# Patient Record
Sex: FEMALE | Race: WHITE | NOT HISPANIC OR LATINO | Employment: FULL TIME | URBAN - NONMETROPOLITAN AREA
[De-identification: names, ages, dates, MRNs, and addresses within clinical notes are randomized per-mention and may not be internally consistent; named-entity substitution may affect disease eponyms.]

---

## 2017-04-18 ENCOUNTER — PROCEDURE VISIT (OUTPATIENT)
Dept: OBSTETRICS AND GYNECOLOGY | Facility: CLINIC | Age: 32
End: 2017-04-18

## 2017-04-18 ENCOUNTER — OFFICE VISIT (OUTPATIENT)
Dept: OBSTETRICS AND GYNECOLOGY | Facility: CLINIC | Age: 32
End: 2017-04-18

## 2017-04-18 VITALS
BODY MASS INDEX: 31.34 KG/M2 | WEIGHT: 166 LBS | DIASTOLIC BLOOD PRESSURE: 64 MMHG | SYSTOLIC BLOOD PRESSURE: 106 MMHG | HEIGHT: 61 IN

## 2017-04-18 DIAGNOSIS — F17.200 SMOKER: ICD-10-CM

## 2017-04-18 DIAGNOSIS — N92.0 MENORRHAGIA WITH REGULAR CYCLE: Primary | ICD-10-CM

## 2017-04-18 DIAGNOSIS — N93.8 DUB (DYSFUNCTIONAL UTERINE BLEEDING): Primary | ICD-10-CM

## 2017-04-18 PROCEDURE — 76830 TRANSVAGINAL US NON-OB: CPT | Performed by: OBSTETRICS & GYNECOLOGY

## 2017-04-18 PROCEDURE — 99203 OFFICE O/P NEW LOW 30 MIN: CPT | Performed by: OBSTETRICS & GYNECOLOGY

## 2017-04-18 RX ORDER — PHENTERMINE HYDROCHLORIDE 37.5 MG/1
TABLET ORAL
Refills: 2 | COMMUNITY
Start: 2017-04-06 | End: 2019-04-18

## 2017-04-18 NOTE — PROGRESS NOTES
Subjective   Chief Complaint   Patient presents with   • Transitional Care Management     New pt referred by ZARI Estrella for DUB.     Beverley Silverio is a 31 y.o. year old  presenting to be seen with complaints of trouble with her menses.    MENSTRUAL Hx:  Patient's last menstrual period was 2017 (exact date).  In the past 6 months her cycles have been regular, predictable and occur monthly.   Her menstrual flow is typically excessive.   Each month on average there are roughly 7-10 days of very heavy flow.  Intermenstrual bleeding is absent.  Post-coital bleeding is absent.  Dysmenorrhea: none and is not affecting her activities of daily living  PMS: none and is not affecting her activities of daily living  Her cycles are a source of concern for her that she wishes to discuss today.    SEXUAL Hx:  She is sexually active.  In the past year there has not been new sexual partners.  Condoms are not typically used.  She would not like to be screened for STD's at today's exam.  Current birth control method: tubal ligation and at time of  section.    No Additional Complaints Reported    The following portions of the patient's history were reviewed and updated as appropriate:current medications, allergies, past family history, past medical history, past social history and past surgical history    Smoking status: Current Every Day Smoker                                                   Packs/day: 0.00      Years: 0.00         Types: Cigarettes  Smokeless status: Current User                      Review of Systems   Constitutional: Negative for fever and unexpected weight change.   Eyes: Negative for photophobia and visual disturbance.   Respiratory: Negative for cough, choking and chest tightness.    Gastrointestinal: Negative for constipation, diarrhea and nausea.   Endocrine: Negative for cold intolerance and heat intolerance.   Genitourinary: Positive for menstrual problem and vaginal bleeding.  "Negative for pelvic pain and vaginal discharge.   Neurological: Negative for light-headedness and headaches.   Psychiatric/Behavioral: Negative for confusion, decreased concentration and dysphoric mood.         Objective   /64 (BP Location: Right arm, Patient Position: Sitting, Cuff Size: Adult)  Ht 61\" (154.9 cm)  Wt 166 lb (75.3 kg)  LMP 04/17/2017 (Exact Date)  Breastfeeding? No  BMI 31.37 kg/m2    General:  well developed; well nourished  no acute distress   Skin:  No suspicious lesions seen   Thyroid: normal to inspection and palpation   Lungs:  breathing is unlabored   Heart:  regular rate and rhythm, S1, S2 normal, no murmur, click, rub or gallop   Breasts:  Not performed.   Abdomen: soft, non-tender; no masses  no umbilical or inginual hernias are present  no hepato-splenomegaly   Pelvis: Not performed.     Lab Review   No data reviewed    Imaging   Pelvic ultrasound images independantly reviewed - and is normal        Assessment   1. Menorrhagia     Plan   1. Options of expectant management, medical therapies, ablation and hysterectomy discussed  2. Labs  3. RTO for poss EMBx and pap smear collection  4. Follow up 2 weeks    New Medications Ordered This Visit   Medications   • phentermine (ADIPEX-P) 37.5 MG tablet     Sig: TK ONE T PO QAM     Refill:  2          This note was electronically signed.    Jj Danielson MD  April 18, 2017    "

## 2017-04-19 LAB
BASOPHILS # BLD AUTO: 0.03 10*3/MM3 (ref 0–0.2)
BASOPHILS NFR BLD AUTO: 0.3 % (ref 0–2)
EOSINOPHIL # BLD AUTO: 0.2 10*3/MM3 (ref 0–0.7)
EOSINOPHIL NFR BLD AUTO: 1.7 % (ref 0–4)
ERYTHROCYTE [DISTWIDTH] IN BLOOD BY AUTOMATED COUNT: 14.2 % (ref 12–15)
HCT VFR BLD AUTO: 40.4 % (ref 37–47)
HGB BLD-MCNC: 12.8 G/DL (ref 12–16)
IMM GRANULOCYTES # BLD: 0.03 10*3/MM3 (ref 0–0.03)
IMM GRANULOCYTES NFR BLD: 0.3 % (ref 0–5)
LYMPHOCYTES # BLD AUTO: 2.9 10*3/MM3 (ref 0.72–4.86)
LYMPHOCYTES NFR BLD AUTO: 24.7 % (ref 15–45)
MCH RBC QN AUTO: 29.1 PG (ref 28–32)
MCHC RBC AUTO-ENTMCNC: 31.7 G/DL (ref 33–36)
MCV RBC AUTO: 91.8 FL (ref 82–98)
MONOCYTES # BLD AUTO: 0.65 10*3/MM3 (ref 0.19–1.3)
MONOCYTES NFR BLD AUTO: 5.5 % (ref 4–12)
NEUTROPHILS # BLD AUTO: 7.91 10*3/MM3 (ref 1.87–8.4)
NEUTROPHILS NFR BLD AUTO: 67.5 % (ref 39–78)
PLATELET # BLD AUTO: 361 10*3/MM3 (ref 130–400)
RBC # BLD AUTO: 4.4 10*6/MM3 (ref 4.2–5.4)
T3RU NFR SERPL: 24 % (ref 24–39)
T4 FREE SERPL-MCNC: 1.36 NG/DL (ref 0.78–2.19)
TSH SERPL DL<=0.005 MIU/L-ACNC: 0.2 MIU/ML (ref 0.47–4.68)
WBC # BLD AUTO: 11.72 10*3/MM3 (ref 4.8–10.8)

## 2017-05-03 ENCOUNTER — OFFICE VISIT (OUTPATIENT)
Dept: OBSTETRICS AND GYNECOLOGY | Facility: CLINIC | Age: 32
End: 2017-05-03

## 2017-05-03 VITALS
BODY MASS INDEX: 31.91 KG/M2 | SYSTOLIC BLOOD PRESSURE: 110 MMHG | HEIGHT: 61 IN | WEIGHT: 169 LBS | DIASTOLIC BLOOD PRESSURE: 70 MMHG

## 2017-05-03 DIAGNOSIS — F17.200 SMOKER: ICD-10-CM

## 2017-05-03 DIAGNOSIS — N92.0 MENORRHAGIA WITH REGULAR CYCLE: Primary | ICD-10-CM

## 2017-05-03 LAB
B-HCG UR QL: NEGATIVE
INTERNAL NEGATIVE CONTROL: NEGATIVE
INTERNAL POSITIVE CONTROL: NEGATIVE
Lab: NORMAL

## 2017-05-03 PROCEDURE — 99214 OFFICE O/P EST MOD 30 MIN: CPT | Performed by: OBSTETRICS & GYNECOLOGY

## 2017-05-03 PROCEDURE — 87624 HPV HI-RISK TYP POOLED RSLT: CPT | Performed by: OBSTETRICS & GYNECOLOGY

## 2017-05-03 PROCEDURE — 88305 TISSUE EXAM BY PATHOLOGIST: CPT | Performed by: OBSTETRICS & GYNECOLOGY

## 2017-05-03 PROCEDURE — 58100 BIOPSY OF UTERUS LINING: CPT | Performed by: OBSTETRICS & GYNECOLOGY

## 2017-05-03 PROCEDURE — 81025 URINE PREGNANCY TEST: CPT | Performed by: OBSTETRICS & GYNECOLOGY

## 2017-05-03 PROCEDURE — G0123 SCREEN CERV/VAG THIN LAYER: HCPCS | Performed by: OBSTETRICS & GYNECOLOGY

## 2017-05-03 RX ORDER — SODIUM CHLORIDE 9 MG/ML
100 INJECTION, SOLUTION INTRAVENOUS CONTINUOUS
Status: CANCELLED | OUTPATIENT
Start: 2017-05-03

## 2017-05-03 RX ORDER — SODIUM CHLORIDE 0.9 % (FLUSH) 0.9 %
1-10 SYRINGE (ML) INJECTION AS NEEDED
Status: CANCELLED | OUTPATIENT
Start: 2017-05-03

## 2017-05-05 ENCOUNTER — TELEPHONE (OUTPATIENT)
Dept: OBSTETRICS AND GYNECOLOGY | Facility: CLINIC | Age: 32
End: 2017-05-05

## 2017-05-05 LAB
CYTO UR: NORMAL
LAB AP CASE REPORT: NORMAL
LAB AP CLINICAL INFORMATION: NORMAL
Lab: NORMAL
PATH REPORT.FINAL DX SPEC: NORMAL
PATH REPORT.GROSS SPEC: NORMAL

## 2017-05-08 ENCOUNTER — APPOINTMENT (OUTPATIENT)
Dept: PREADMISSION TESTING | Facility: HOSPITAL | Age: 32
End: 2017-05-08

## 2017-05-08 ENCOUNTER — RESULTS ENCOUNTER (OUTPATIENT)
Dept: OBSTETRICS AND GYNECOLOGY | Facility: CLINIC | Age: 32
End: 2017-05-08

## 2017-05-08 VITALS
WEIGHT: 173.4 LBS | OXYGEN SATURATION: 100 % | SYSTOLIC BLOOD PRESSURE: 106 MMHG | HEART RATE: 78 BPM | TEMPERATURE: 98.6 F | BODY MASS INDEX: 31.91 KG/M2 | RESPIRATION RATE: 16 BRPM | DIASTOLIC BLOOD PRESSURE: 65 MMHG | HEIGHT: 62 IN

## 2017-05-08 DIAGNOSIS — N92.0 MENORRHAGIA WITH REGULAR CYCLE: ICD-10-CM

## 2017-05-08 LAB
DEPRECATED RDW RBC AUTO: 44.7 FL (ref 40–54)
ERYTHROCYTE [DISTWIDTH] IN BLOOD BY AUTOMATED COUNT: 13.8 % (ref 12–15)
HCT VFR BLD AUTO: 39 % (ref 37–47)
HGB BLD-MCNC: 12.6 G/DL (ref 12–16)
MCH RBC QN AUTO: 28.4 PG (ref 28–32)
MCHC RBC AUTO-ENTMCNC: 32.3 G/DL (ref 33–36)
MCV RBC AUTO: 88 FL (ref 82–98)
PLATELET # BLD AUTO: 346 10*3/MM3 (ref 130–400)
PMV BLD AUTO: 10.7 FL (ref 6–12)
RBC # BLD AUTO: 4.43 10*6/MM3 (ref 4.2–5.4)
WBC NRBC COR # BLD: 9.86 10*3/MM3 (ref 4.8–10.8)

## 2017-05-08 PROCEDURE — 85027 COMPLETE CBC AUTOMATED: CPT | Performed by: OBSTETRICS & GYNECOLOGY

## 2017-05-08 PROCEDURE — 36415 COLL VENOUS BLD VENIPUNCTURE: CPT | Performed by: OBSTETRICS & GYNECOLOGY

## 2017-05-09 LAB
GEN CATEG CVX/VAG CYTO-IMP: NORMAL
LAB AP CASE REPORT: NORMAL
LAB AP GYN ADDITIONAL INFORMATION: NORMAL
Lab: NORMAL
PATH INTERP SPEC-IMP: NORMAL
STAT OF ADQ CVX/VAG CYTO-IMP: NORMAL

## 2017-05-11 ENCOUNTER — ANESTHESIA EVENT (OUTPATIENT)
Dept: PERIOP | Facility: HOSPITAL | Age: 32
End: 2017-05-11

## 2017-05-12 ENCOUNTER — HOSPITAL ENCOUNTER (OUTPATIENT)
Facility: HOSPITAL | Age: 32
Setting detail: HOSPITAL OUTPATIENT SURGERY
Discharge: HOME | End: 2017-05-12
Attending: OBSTETRICS & GYNECOLOGY | Admitting: OBSTETRICS & GYNECOLOGY

## 2017-05-12 ENCOUNTER — ANESTHESIA (OUTPATIENT)
Dept: PERIOP | Facility: HOSPITAL | Age: 32
End: 2017-05-12

## 2017-05-12 VITALS
RESPIRATION RATE: 20 BRPM | OXYGEN SATURATION: 96 % | TEMPERATURE: 98.4 F | SYSTOLIC BLOOD PRESSURE: 122 MMHG | HEART RATE: 80 BPM | DIASTOLIC BLOOD PRESSURE: 70 MMHG

## 2017-05-12 DIAGNOSIS — N92.0 MENORRHAGIA WITH REGULAR CYCLE: ICD-10-CM

## 2017-05-12 LAB
ABO GROUP BLD: NORMAL
BLD GP AB SCN SERPL QL: NEGATIVE
HCG SERPL QL: NEGATIVE
RH BLD: POSITIVE

## 2017-05-12 PROCEDURE — 58563 HYSTEROSCOPY ABLATION: CPT | Performed by: OBSTETRICS & GYNECOLOGY

## 2017-05-12 PROCEDURE — 86850 RBC ANTIBODY SCREEN: CPT | Performed by: OBSTETRICS & GYNECOLOGY

## 2017-05-12 PROCEDURE — 25010000002 MIDAZOLAM PER 1 MG: Performed by: ANESTHESIOLOGY

## 2017-05-12 PROCEDURE — 25010000002 DEXAMETHASONE PER 1 MG: Performed by: ANESTHESIOLOGY

## 2017-05-12 PROCEDURE — 25010000002 KETOROLAC TROMETHAMINE PER 15 MG: Performed by: NURSE ANESTHETIST, CERTIFIED REGISTERED

## 2017-05-12 PROCEDURE — 86900 BLOOD TYPING SEROLOGIC ABO: CPT | Performed by: OBSTETRICS & GYNECOLOGY

## 2017-05-12 PROCEDURE — 25010000002 PROPOFOL 10 MG/ML EMULSION: Performed by: NURSE ANESTHETIST, CERTIFIED REGISTERED

## 2017-05-12 PROCEDURE — 86901 BLOOD TYPING SEROLOGIC RH(D): CPT | Performed by: OBSTETRICS & GYNECOLOGY

## 2017-05-12 PROCEDURE — 84703 CHORIONIC GONADOTROPIN ASSAY: CPT | Performed by: OBSTETRICS & GYNECOLOGY

## 2017-05-12 PROCEDURE — 25010000002 FENTANYL CITRATE (PF) 100 MCG/2ML SOLUTION: Performed by: NURSE ANESTHETIST, CERTIFIED REGISTERED

## 2017-05-12 RX ORDER — PROPOFOL 10 MG/ML
VIAL (ML) INTRAVENOUS AS NEEDED
Status: DISCONTINUED | OUTPATIENT
Start: 2017-05-12 | End: 2017-05-12 | Stop reason: SURG

## 2017-05-12 RX ORDER — MEPERIDINE HYDROCHLORIDE 25 MG/ML
12.5 INJECTION INTRAMUSCULAR; INTRAVENOUS; SUBCUTANEOUS
Status: DISCONTINUED | OUTPATIENT
Start: 2017-05-12 | End: 2017-05-12 | Stop reason: HOSPADM

## 2017-05-12 RX ORDER — MIDAZOLAM HYDROCHLORIDE 1 MG/ML
2 INJECTION INTRAMUSCULAR; INTRAVENOUS
Status: DISCONTINUED | OUTPATIENT
Start: 2017-05-12 | End: 2017-05-12 | Stop reason: HOSPADM

## 2017-05-12 RX ORDER — LABETALOL HYDROCHLORIDE 5 MG/ML
5 INJECTION, SOLUTION INTRAVENOUS
Status: DISCONTINUED | OUTPATIENT
Start: 2017-05-12 | End: 2017-05-12 | Stop reason: HOSPADM

## 2017-05-12 RX ORDER — MIDAZOLAM HYDROCHLORIDE 1 MG/ML
1 INJECTION INTRAMUSCULAR; INTRAVENOUS
Status: DISCONTINUED | OUTPATIENT
Start: 2017-05-12 | End: 2017-05-12 | Stop reason: HOSPADM

## 2017-05-12 RX ORDER — SCOLOPAMINE TRANSDERMAL SYSTEM 1 MG/1
1 PATCH, EXTENDED RELEASE TRANSDERMAL ONCE
Status: DISCONTINUED | OUTPATIENT
Start: 2017-05-12 | End: 2017-05-12 | Stop reason: HOSPADM

## 2017-05-12 RX ORDER — IPRATROPIUM BROMIDE AND ALBUTEROL SULFATE 2.5; .5 MG/3ML; MG/3ML
3 SOLUTION RESPIRATORY (INHALATION) ONCE AS NEEDED
Status: DISCONTINUED | OUTPATIENT
Start: 2017-05-12 | End: 2017-05-12 | Stop reason: HOSPADM

## 2017-05-12 RX ORDER — SODIUM CHLORIDE, SODIUM LACTATE, POTASSIUM CHLORIDE, CALCIUM CHLORIDE 600; 310; 30; 20 MG/100ML; MG/100ML; MG/100ML; MG/100ML
9 INJECTION, SOLUTION INTRAVENOUS CONTINUOUS
Status: DISCONTINUED | OUTPATIENT
Start: 2017-05-12 | End: 2017-05-12 | Stop reason: HOSPADM

## 2017-05-12 RX ORDER — DEXTROSE MONOHYDRATE 25 G/50ML
12.5 INJECTION, SOLUTION INTRAVENOUS AS NEEDED
Status: DISCONTINUED | OUTPATIENT
Start: 2017-05-12 | End: 2017-05-12 | Stop reason: HOSPADM

## 2017-05-12 RX ORDER — DEXAMETHASONE SODIUM PHOSPHATE 4 MG/ML
4 INJECTION, SOLUTION INTRA-ARTICULAR; INTRALESIONAL; INTRAMUSCULAR; INTRAVENOUS; SOFT TISSUE ONCE AS NEEDED
Status: COMPLETED | OUTPATIENT
Start: 2017-05-12 | End: 2017-05-12

## 2017-05-12 RX ORDER — HYDRALAZINE HYDROCHLORIDE 20 MG/ML
5 INJECTION INTRAMUSCULAR; INTRAVENOUS
Status: DISCONTINUED | OUTPATIENT
Start: 2017-05-12 | End: 2017-05-12 | Stop reason: HOSPADM

## 2017-05-12 RX ORDER — SODIUM CHLORIDE 0.9 % (FLUSH) 0.9 %
1-10 SYRINGE (ML) INJECTION AS NEEDED
Status: DISCONTINUED | OUTPATIENT
Start: 2017-05-12 | End: 2017-05-12 | Stop reason: HOSPADM

## 2017-05-12 RX ORDER — HYDROCODONE BITARTRATE AND ACETAMINOPHEN 5; 325 MG/1; MG/1
1-2 TABLET ORAL EVERY 4 HOURS PRN
Qty: 30 TABLET | Refills: 0 | Status: SHIPPED | OUTPATIENT
Start: 2017-05-12 | End: 2017-05-31 | Stop reason: HOSPADM

## 2017-05-12 RX ORDER — ONDANSETRON 2 MG/ML
4 INJECTION INTRAMUSCULAR; INTRAVENOUS ONCE AS NEEDED
Status: DISCONTINUED | OUTPATIENT
Start: 2017-05-12 | End: 2017-05-12 | Stop reason: HOSPADM

## 2017-05-12 RX ORDER — DIPHENHYDRAMINE HYDROCHLORIDE 50 MG/ML
12.5 INJECTION INTRAMUSCULAR; INTRAVENOUS
Status: DISCONTINUED | OUTPATIENT
Start: 2017-05-12 | End: 2017-05-12 | Stop reason: HOSPADM

## 2017-05-12 RX ORDER — MAGNESIUM HYDROXIDE 1200 MG/15ML
LIQUID ORAL AS NEEDED
Status: DISCONTINUED | OUTPATIENT
Start: 2017-05-12 | End: 2017-05-12 | Stop reason: HOSPADM

## 2017-05-12 RX ORDER — LIDOCAINE HYDROCHLORIDE 20 MG/ML
INJECTION, SOLUTION INFILTRATION; PERINEURAL AS NEEDED
Status: DISCONTINUED | OUTPATIENT
Start: 2017-05-12 | End: 2017-05-12 | Stop reason: SURG

## 2017-05-12 RX ORDER — MORPHINE SULFATE 2 MG/ML
2 INJECTION, SOLUTION INTRAMUSCULAR; INTRAVENOUS
Status: DISCONTINUED | OUTPATIENT
Start: 2017-05-12 | End: 2017-05-12 | Stop reason: HOSPADM

## 2017-05-12 RX ORDER — FENTANYL CITRATE 50 UG/ML
INJECTION, SOLUTION INTRAMUSCULAR; INTRAVENOUS AS NEEDED
Status: DISCONTINUED | OUTPATIENT
Start: 2017-05-12 | End: 2017-05-12 | Stop reason: SURG

## 2017-05-12 RX ORDER — IBUPROFEN 600 MG/1
600 TABLET ORAL EVERY 6 HOURS PRN
Status: DISCONTINUED | OUTPATIENT
Start: 2017-05-12 | End: 2017-05-12 | Stop reason: HOSPADM

## 2017-05-12 RX ORDER — KETOROLAC TROMETHAMINE 30 MG/ML
INJECTION, SOLUTION INTRAMUSCULAR; INTRAVENOUS AS NEEDED
Status: DISCONTINUED | OUTPATIENT
Start: 2017-05-12 | End: 2017-05-12 | Stop reason: SURG

## 2017-05-12 RX ORDER — NALOXONE HCL 0.4 MG/ML
0.4 VIAL (ML) INJECTION AS NEEDED
Status: DISCONTINUED | OUTPATIENT
Start: 2017-05-12 | End: 2017-05-12 | Stop reason: HOSPADM

## 2017-05-12 RX ORDER — SODIUM CHLORIDE 9 MG/ML
100 INJECTION, SOLUTION INTRAVENOUS CONTINUOUS
Status: DISCONTINUED | OUTPATIENT
Start: 2017-05-12 | End: 2017-05-12 | Stop reason: HOSPADM

## 2017-05-12 RX ORDER — FENTANYL CITRATE 50 UG/ML
25 INJECTION, SOLUTION INTRAMUSCULAR; INTRAVENOUS
Status: DISCONTINUED | OUTPATIENT
Start: 2017-05-12 | End: 2017-05-12 | Stop reason: HOSPADM

## 2017-05-12 RX ADMIN — SCOPALAMINE 1 PATCH: 1 PATCH, EXTENDED RELEASE TRANSDERMAL at 06:37

## 2017-05-12 RX ADMIN — FENTANYL CITRATE 100 MCG: 50 INJECTION INTRAMUSCULAR; INTRAVENOUS at 07:14

## 2017-05-12 RX ADMIN — SODIUM CHLORIDE, POTASSIUM CHLORIDE, SODIUM LACTATE AND CALCIUM CHLORIDE 9 ML/HR: 600; 310; 30; 20 INJECTION, SOLUTION INTRAVENOUS at 06:36

## 2017-05-12 RX ADMIN — FENTANYL CITRATE 100 MCG: 50 INJECTION INTRAMUSCULAR; INTRAVENOUS at 07:19

## 2017-05-12 RX ADMIN — MIDAZOLAM HYDROCHLORIDE 2 MG: 1 INJECTION, SOLUTION INTRAMUSCULAR; INTRAVENOUS at 06:36

## 2017-05-12 RX ADMIN — KETOROLAC TROMETHAMINE 30 MG: 30 INJECTION, SOLUTION INTRAMUSCULAR at 07:26

## 2017-05-12 RX ADMIN — LIDOCAINE HYDROCHLORIDE 1 ML: 10 INJECTION, SOLUTION EPIDURAL; INFILTRATION; INTRACAUDAL; PERINEURAL at 06:36

## 2017-05-12 RX ADMIN — DEXAMETHASONE SODIUM PHOSPHATE 4 MG: 4 INJECTION, SOLUTION INTRAMUSCULAR; INTRAVENOUS at 06:36

## 2017-05-12 RX ADMIN — FENTANYL CITRATE 50 MCG: 50 INJECTION INTRAMUSCULAR; INTRAVENOUS at 07:17

## 2017-05-12 RX ADMIN — LIDOCAINE HYDROCHLORIDE 50 MG: 20 INJECTION, SOLUTION INFILTRATION; PERINEURAL at 07:14

## 2017-05-12 RX ADMIN — PROPOFOL 200 MG: 10 INJECTION, EMULSION INTRAVENOUS at 07:14

## 2017-05-31 ENCOUNTER — OFFICE VISIT (OUTPATIENT)
Dept: OBSTETRICS AND GYNECOLOGY | Facility: CLINIC | Age: 32
End: 2017-05-31

## 2017-05-31 VITALS
BODY MASS INDEX: 31.65 KG/M2 | WEIGHT: 172 LBS | DIASTOLIC BLOOD PRESSURE: 74 MMHG | HEIGHT: 62 IN | SYSTOLIC BLOOD PRESSURE: 120 MMHG

## 2017-05-31 DIAGNOSIS — N92.0 MENORRHAGIA WITH REGULAR CYCLE: ICD-10-CM

## 2017-05-31 DIAGNOSIS — Z09 POSTOP CHECK: Primary | ICD-10-CM

## 2017-05-31 PROCEDURE — 99213 OFFICE O/P EST LOW 20 MIN: CPT | Performed by: OBSTETRICS & GYNECOLOGY

## 2018-11-05 ENCOUNTER — LAB (OUTPATIENT)
Dept: LAB | Facility: HOSPITAL | Age: 33
End: 2018-11-05

## 2018-11-05 ENCOUNTER — TRANSCRIBE ORDERS (OUTPATIENT)
Dept: ADMINISTRATIVE | Facility: HOSPITAL | Age: 33
End: 2018-11-05

## 2018-11-05 ENCOUNTER — HOSPITAL ENCOUNTER (OUTPATIENT)
Dept: CT IMAGING | Facility: HOSPITAL | Age: 33
Discharge: HOME | End: 2018-11-05
Admitting: NURSE PRACTITIONER

## 2018-11-05 ENCOUNTER — HOSPITAL ENCOUNTER (OUTPATIENT)
Dept: GENERAL RADIOLOGY | Facility: HOSPITAL | Age: 33
Discharge: HOME | End: 2018-11-05

## 2018-11-05 DIAGNOSIS — R51.9 NONINTRACTABLE HEADACHE, UNSPECIFIED CHRONICITY PATTERN, UNSPECIFIED HEADACHE TYPE: ICD-10-CM

## 2018-11-05 DIAGNOSIS — M54.2 CERVICALGIA: ICD-10-CM

## 2018-11-05 DIAGNOSIS — R51.9 NONINTRACTABLE HEADACHE, UNSPECIFIED CHRONICITY PATTERN, UNSPECIFIED HEADACHE TYPE: Primary | ICD-10-CM

## 2018-11-05 LAB
AUTO MIXED CELLS #: 0.7 10*3/MM3 (ref 0.1–2.6)
AUTO MIXED CELLS %: 6.1 % (ref 0.1–24)
ERYTHROCYTE [DISTWIDTH] IN BLOOD BY AUTOMATED COUNT: 12.8 % (ref 12–15)
ERYTHROCYTE [SEDIMENTATION RATE] IN BLOOD: 7 MM/HR (ref 0–20)
HCT VFR BLD AUTO: 42.9 % (ref 37–47)
HGB BLD-MCNC: 13.8 G/DL (ref 12–16)
LYMPHOCYTES # BLD AUTO: 2.9 10*3/MM3 (ref 0.8–7)
LYMPHOCYTES NFR BLD AUTO: 25.6 % (ref 15–45)
MCH RBC QN AUTO: 29.1 PG (ref 28–32)
MCHC RBC AUTO-ENTMCNC: 32.2 G/DL (ref 33–36)
MCV RBC AUTO: 90.5 FL (ref 82–98)
NEUTROPHILS # BLD AUTO: 7.7 10*3/MM3 (ref 1.5–8.3)
NEUTROPHILS NFR BLD AUTO: 68.3 % (ref 39–78)
PLATELET # BLD AUTO: 340 10*3/MM3 (ref 130–400)
PMV BLD AUTO: 9.6 FL (ref 6–12)
RBC # BLD AUTO: 4.74 10*6/MM3 (ref 4.2–5.4)
WBC NRBC COR # BLD: 11.3 10*3/MM3 (ref 4.8–10.8)

## 2018-11-05 PROCEDURE — 85652 RBC SED RATE AUTOMATED: CPT

## 2018-11-05 PROCEDURE — 85025 COMPLETE CBC W/AUTO DIFF WBC: CPT | Performed by: NURSE PRACTITIONER

## 2018-11-05 PROCEDURE — 70450 CT HEAD/BRAIN W/O DYE: CPT

## 2018-11-05 PROCEDURE — 72050 X-RAY EXAM NECK SPINE 4/5VWS: CPT

## 2018-11-05 PROCEDURE — 36415 COLL VENOUS BLD VENIPUNCTURE: CPT

## 2019-03-20 ENCOUNTER — TRANSCRIBE ORDERS (OUTPATIENT)
Dept: ADMINISTRATIVE | Facility: HOSPITAL | Age: 34
End: 2019-03-20

## 2019-03-20 DIAGNOSIS — E04.2 MULTIPLE THYROID NODULES: Primary | ICD-10-CM

## 2019-04-02 ENCOUNTER — HOSPITAL ENCOUNTER (OUTPATIENT)
Dept: NUCLEAR MEDICINE | Facility: HOSPITAL | Age: 34
Discharge: HOME | End: 2019-04-02

## 2019-04-02 DIAGNOSIS — E04.2 MULTIPLE THYROID NODULES: ICD-10-CM

## 2019-04-02 PROCEDURE — 0 SODIUM IODIDE 3.7 MBQ CAPSULE: Performed by: NURSE PRACTITIONER

## 2019-04-02 PROCEDURE — A9516 IODINE I-123 SOD IODIDE MIC: HCPCS | Performed by: NURSE PRACTITIONER

## 2019-04-02 PROCEDURE — 78014 THYROID IMAGING W/BLOOD FLOW: CPT

## 2019-04-02 RX ORDER — SODIUM IODIDE I 123 100 UCI/1
1 CAPSULE, GELATIN COATED ORAL
Status: COMPLETED | OUTPATIENT
Start: 2019-04-02 | End: 2019-04-02

## 2019-04-02 RX ADMIN — SODIUM IODIDE I 123 1 CAPSULE: 100 CAPSULE, GELATIN COATED ORAL at 08:07

## 2019-04-03 ENCOUNTER — HOSPITAL ENCOUNTER (OUTPATIENT)
Dept: NUCLEAR MEDICINE | Facility: HOSPITAL | Age: 34
Discharge: HOME | End: 2019-04-03

## 2019-04-18 ENCOUNTER — OFFICE VISIT (OUTPATIENT)
Dept: GASTROENTEROLOGY | Facility: CLINIC | Age: 34
End: 2019-04-18

## 2019-04-18 VITALS
HEART RATE: 88 BPM | WEIGHT: 213 LBS | BODY MASS INDEX: 40.22 KG/M2 | DIASTOLIC BLOOD PRESSURE: 80 MMHG | SYSTOLIC BLOOD PRESSURE: 122 MMHG | HEIGHT: 61 IN | OXYGEN SATURATION: 99 %

## 2019-04-18 DIAGNOSIS — Z78.9 NONSMOKER: ICD-10-CM

## 2019-04-18 DIAGNOSIS — R10.32 LEFT LOWER QUADRANT PAIN: Primary | ICD-10-CM

## 2019-04-18 DIAGNOSIS — E66.9 OBESITY, UNSPECIFIED OBESITY SEVERITY, UNSPECIFIED OBESITY TYPE: ICD-10-CM

## 2019-04-18 PROCEDURE — 99203 OFFICE O/P NEW LOW 30 MIN: CPT | Performed by: CLINICAL NURSE SPECIALIST

## 2019-04-18 NOTE — PROGRESS NOTES
Beverley Silverio  1985  Chief Complaint   Patient presents with   • GI Problem     New patient ref by Avelino Arce NP for LLQ pain     Subjective   HPI  Beverley Silverio is a 33 y.o. female who presents with a complaint of LLQ abdominal pain that lasted one week. It began 2 weeks before CT scan and lasted a week. No triggers. No alleviating factors. It was sharp and stabbing the day of her period it started. No other associated symptoms. She went to her PCP and CT was performed with contrast on 2019. No acute process benign appearing bone lesion in the pelvis. She was referred here for opinion. She has no change in bowels no constipation no diarrhea. No family hx for colon cancer. No rectal bleeding. Her pain lasted one week and she says that it is gone and has not returned since that time.    Past Medical History:   Diagnosis Date   • Abnormal Pap smear of cervix    • Anxiety    • Menorrhagia with irregular cycle    • Migraine      Past Surgical History:   Procedure Laterality Date   •  SECTION     • D&C HYSTEROSCOPY ENDOMETRIAL ABLATION N/A 2017    Procedure: DILATATION AND CURETTAGE HYSTEROSCOPY NOVASURE ENDOMETRIAL ABLATION;  Surgeon: Jj Danielson MD;  Location: [facility];  Service:    • TUBAL ABDOMINAL LIGATION         No outpatient medications have been marked as taking for the 19 encounter (Office Visit) with Gayathri Retana APRN.     Allergies   Allergen Reactions   • Sulfa Antibiotics Rash     Social History     Socioeconomic History   • Marital status:      Spouse name: Not on file   • Number of children: Not on file   • Years of education: Not on file   • Highest education level: Not on file   Tobacco Use   • Smoking status: Former Smoker     Packs/day: 0.50     Years: 17.00     Pack years: 8.50     Types: Cigarettes   • Smokeless tobacco: Never Used   • Tobacco comment: Quite over a year ago   Substance and Sexual Activity   • Alcohol use: No   • Drug  "use: No   • Sexual activity: Not Currently     Partners: Male     Birth control/protection: None     Family History   Problem Relation Age of Onset   • Skin cancer Father    • No Known Problems Mother    • No Known Problems Sister    • No Known Problems Daughter    • Colon cancer Neg Hx    • Colon polyps Neg Hx      Health Maintenance   Topic Date Due   • ANNUAL PHYSICAL  09/18/1986   • PNEUMOCOCCAL VACCINE (19-64 MEDIUM RISK) (1 of 1 - PPSV23) 09/18/2002   • TDAP/TD VACCINES (1 - Tdap) 09/18/2002   • INFLUENZA VACCINE  08/01/2019   • PAP SMEAR  05/03/2020     Review of Systems   Constitutional: Negative for activity change, appetite change, chills, diaphoresis, fatigue, fever and unexpected weight change.   HENT: Negative for ear pain, hearing loss, mouth sores, sore throat, trouble swallowing and voice change.    Eyes: Negative.    Respiratory: Negative for cough, choking, shortness of breath and wheezing.    Cardiovascular: Negative for chest pain and palpitations.   Gastrointestinal: Positive for abdominal pain (LLQ). Negative for blood in stool, constipation, diarrhea, nausea and vomiting.   Endocrine: Negative for cold intolerance and heat intolerance.   Genitourinary: Negative for decreased urine volume, dysuria, frequency, hematuria and urgency.   Musculoskeletal: Negative for back pain, gait problem and myalgias.   Skin: Negative for color change, pallor and rash.   Allergic/Immunologic: Negative for food allergies and immunocompromised state.   Neurological: Negative for dizziness, tremors, seizures, syncope, weakness, light-headedness, numbness and headaches.   Hematological: Negative for adenopathy. Does not bruise/bleed easily.   Psychiatric/Behavioral: Negative for agitation and confusion. The patient is not nervous/anxious.    All other systems reviewed and are negative.    Objective   Vitals:    04/18/19 1003   BP: 122/80   Pulse: 88   SpO2: 99%   Weight: 96.6 kg (213 lb)   Height: 154.9 cm (61\") "     Body mass index is 40.25 kg/m².  Physical Exam   Constitutional: She is oriented to person, place, and time. She appears well-developed and well-nourished.   HENT:   Head: Normocephalic and atraumatic.   Eyes: Pupils are equal, round, and reactive to light.   Neck: Normal range of motion. Neck supple. No tracheal deviation present.   Cardiovascular: Normal rate, regular rhythm and normal heart sounds. Exam reveals no gallop and no friction rub.   No murmur heard.  Pulmonary/Chest: Effort normal and breath sounds normal. No respiratory distress. She has no wheezes. She has no rales. She exhibits no tenderness.   Abdominal: Soft. Bowel sounds are normal. She exhibits no distension. There is no hepatosplenomegaly. There is no tenderness. There is no rigidity, no rebound and no guarding.   Musculoskeletal: Normal range of motion. She exhibits no edema, tenderness or deformity.   Neurological: She is alert and oriented to person, place, and time. She has normal reflexes.   Skin: Skin is warm and dry. No rash noted. No pallor.   Psychiatric: She has a normal mood and affect. Her behavior is normal. Judgment and thought content normal.     Assessment/Plan   Beverley was seen today for gi problem.    Diagnoses and all orders for this visit:    Left lower quadrant pain  Comments:  the day she stared her period and lasted one week.    Nonsmoker    Obesity, unspecified obesity severity, unspecified obesity type    To have repeat xrays ordered by kamala with Dr Rizo. We have discussed colonoscopy evaluation and she wants to wait at this time. Her pain is resolved and she has no recurrent symptoms or GI related symptoms at this time. She is to call me if she changes her mind or desires to be seen again for recurrent symptoms.   I have reviewed and discussed her records today.     EMR Dragon/transcription disclaimer: Much of this encounter note is electronic transcription/translation of spoken language to printed text. The  electronic translation of spoken language may be erroneous, or at times, nonsensical words or phrases may be inadvertently transcribed. Although I have reviewed the note for such errors, some may still exist.  Body mass index is 40.25 kg/m².  No Follow-up on file.    Patient's Body mass index is 40.25 kg/m². BMI is above normal parameters. Recommendations include: nutrition counseling.      All risks, benefits, alternatives, and indications of colonoscopy and/or Endoscopy procedure have been discussed with the patient. Risks to include perforation of the colon requiring possible surgery or colostomy, risk of bleeding from biopsies or removal of colon tissue, possibility of missing a colon polyp or cancer, or adverse drug reaction.  Benefits to include the diagnosis and management of disease of the colon and rectum. Alternatives to include barium enema, radiographic evaluation, lab testing or no intervention. Pt verbalizes understanding and agrees.     Gayathri Retana, APRN  4/18/2019  11:00 AM      Obesity, Adult  Obesity is the condition of having too much total body fat. Being overweight or obese means that your weight is greater than what is considered healthy for your body size. Obesity is determined by a measurement called BMI. BMI is an estimate of body fat and is calculated from height and weight. For adults, a BMI of 30 or higher is considered obese.  Obesity can eventually lead to other health concerns and major illnesses, including:  · Stroke.  · Coronary artery disease (CAD).  · Type 2 diabetes.  · Some types of cancer, including cancers of the colon, breast, uterus, and gallbladder.  · Osteoarthritis.  · High blood pressure (hypertension).  · High cholesterol.  · Sleep apnea.  · Gallbladder stones.  · Infertility problems.  What are the causes?  The main cause of obesity is taking in (consuming) more calories than your body uses for energy. Other factors that contribute to this condition may  include:  · Being born with genes that make you more likely to become obese.  · Having a medical condition that causes obesity. These conditions include:  ¨ Hypothyroidism.  ¨ Polycystic ovarian syndrome (PCOS).  ¨ Binge-eating disorder.  ¨ Cushing syndrome.  · Taking certain medicines, such as steroids, antidepressants, and seizure medicines.  · Not being physically active (sedentary lifestyle).  · Living where there are limited places to exercise safely or buy healthy foods.  · Not getting enough sleep.  What increases the risk?  The following factors may increase your risk of this condition:  · Having a family history of obesity.  · Being a woman of -American descent.  · Being a man of  descent.  What are the signs or symptoms?  Having excessive body fat is the main symptom of this condition.  How is this diagnosed?  This condition may be diagnosed based on:  · Your symptoms.  · Your medical history.  · A physical exam. Your health care provider may measure:  ¨ Your BMI. If you are an adult with a BMI between 25 and less than 30, you are considered overweight. If you are an adult with a BMI of 30 or higher, you are considered obese.  ¨ The distances around your hips and your waist (circumferences). These may be compared to each other to help diagnose your condition.  ¨ Your skinfold thickness. Your health care provider may gently pinch a fold of your skin and measure it.  How is this treated?  Treatment for this condition often includes changing your lifestyle. Treatment may include some or all of the following:  · Dietary changes. Work with your health care provider and a dietitian to set a weight-loss goal that is healthy and reasonable for you. Dietary changes may include eating:  ¨ Smaller portions. A portion size is the amount of a particular food that is healthy for you to eat at one time. This varies from person to person.  ¨ Low-calorie or low-fat options.  ¨ More whole grains, fruits, and  vegetables.  · Regular physical activity. This may include aerobic activity (cardio) and strength training.  · Medicine to help you lose weight. Your health care provider may prescribe medicine if you are unable to lose 1 pound a week after 6 weeks of eating more healthily and doing more physical activity.  · Surgery. Surgical options may include gastric banding and gastric bypass. Surgery may be done if:  ¨ Other treatments have not helped to improve your condition.  ¨ You have a BMI of 40 or higher.  ¨ You have life-threatening health problems related to obesity.  Follow these instructions at home:     Eating and drinking     · Follow recommendations from your health care provider about what you eat and drink. Your health care provider may advise you to:  ¨ Limit fast foods, sweets, and processed snack foods.  ¨ Choose low-fat options, such as low-fat milk instead of whole milk.  ¨ Eat 5 or more servings of fruits or vegetables every day.  ¨ Eat at home more often. This gives you more control over what you eat.  ¨ Choose healthy foods when you eat out.  ¨ Learn what a healthy portion size is.  ¨ Keep low-fat snacks on hand.  ¨ Avoid sugary drinks, such as soda, fruit juice, iced tea sweetened with sugar, and flavored milk.  ¨ Eat a healthy breakfast.  · Drink enough water to keep your urine clear or pale yellow.  · Do not go without eating for long periods of time (do not fast) or follow a fad diet. Fasting and fad diets can be unhealthy and even dangerous.  Physical Activity   · Exercise regularly, as told by your health care provider. Ask your health care provider what types of exercise are safe for you and how often you should exercise.  · Warm up and stretch before being active.  · Cool down and stretch after being active.  · Rest between periods of activity.  Lifestyle   · Limit the time that you spend in front of your TV, computer, or video game system.  · Find ways to reward yourself that do not involve  food.  · Limit alcohol intake to no more than 1 drink a day for nonpregnant women and 2 drinks a day for men. One drink equals 12 oz of beer, 5 oz of wine, or 1½ oz of hard liquor.  General instructions   · Keep a weight loss journal to keep track of the food you eat and how much you exercise you get.  · Take over-the-counter and prescription medicines only as told by your health care provider.  · Take vitamins and supplements only as told by your health care provider.  · Consider joining a support group. Your health care provider may be able to recommend a support group.  · Keep all follow-up visits as told by your health care provider. This is important.  Contact a health care provider if:  · You are unable to meet your weight loss goal after 6 weeks of dietary and lifestyle changes.  This information is not intended to replace advice given to you by your health care provider. Make sure you discuss any questions you have with your health care provider.  Document Released: 01/25/2006 Document Revised: 05/22/2017 Document Reviewed: 10/05/2016  [employer] Interactive Patient Education © 2017 [employer]      If you smoke or use tobacco, 4 minutes reading provided  Steps to Quit Smoking  Smoking tobacco can be harmful to your health and can affect almost every organ in your body. Smoking puts you, and those around you, at risk for developing many serious chronic diseases. Quitting smoking is difficult, but it is one of the best things that you can do for your health. It is never too late to quit.  What are the benefits of quitting smoking?  When you quit smoking, you lower your risk of developing serious diseases and conditions, such as:  · Lung cancer or lung disease, such as COPD.  · Heart disease.  · Stroke.  · Heart attack.  · Infertility.  · Osteoporosis and bone fractures.  Additionally, symptoms such as coughing, wheezing, and shortness of breath may get better when you quit. You may also find that you get sick  less often because your body is stronger at fighting off colds and infections. If you are pregnant, quitting smoking can help to reduce your chances of having a baby of low birth weight.  How do I get ready to quit?  When you decide to quit smoking, create a plan to make sure that you are successful. Before you quit:  · Pick a date to quit. Set a date within the next two weeks to give you time to prepare.  · Write down the reasons why you are quitting. Keep this list in places where you will see it often, such as on your bathroom mirror or in your car or wallet.  · Identify the people, places, things, and activities that make you want to smoke (triggers) and avoid them. Make sure to take these actions:  ¨ Throw away all cigarettes at home, at work, and in your car.  ¨ Throw away smoking accessories, such as ashtrays and lighters.  ¨ Clean your car and make sure to empty the ashtray.  ¨ Clean your home, including curtains and carpets.  · Tell your family, friends, and coworkers that you are quitting. Support from your loved ones can make quitting easier.  · Talk with your health care provider about your options for quitting smoking.  · Find out what treatment options are covered by your health insurance.  What strategies can I use to quit smoking?  Talk with your healthcare provider about different strategies to quit smoking. Some strategies include:  · Quitting smoking altogether instead of gradually lessening how much you smoke over a period of time. Research shows that quitting “cold turkey” is more successful than gradually quitting.  · Attending in-person counseling to help you build problem-solving skills. You are more likely to have success in quitting if you attend several counseling sessions. Even short sessions of 10 minutes can be effective.  · Finding resources and support systems that can help you to quit smoking and remain smoke-free after you quit. These resources are most helpful when you use them  often. They can include:  ¨ Online chats with a counselor.  ¨ Telephone quitlines.  ¨ Printed self-help materials.  ¨ Support groups or group counseling.  ¨ Text messaging programs.  ¨ Mobile phone applications.  · Taking medicines to help you quit smoking. (If you are pregnant or breastfeeding, talk with your health care provider first.) Some medicines contain nicotine and some do not. Both types of medicines help with cravings, but the medicines that include nicotine help to relieve withdrawal symptoms. Your health care provider may recommend:  ¨ Nicotine patches, gum, or lozenges.  ¨ Nicotine inhalers or sprays.  ¨ Non-nicotine medicine that is taken by mouth.  Talk with your health care provider about combining strategies, such as taking medicines while you are also receiving in-person counseling. Using these two strategies together makes you more likely to succeed in quitting than if you used either strategy on its own.  If you are pregnant or breastfeeding, talk with your health care provider about finding counseling or other support strategies to quit smoking. Do not take medicine to help you quit smoking unless told to do so by your health care provider.  What things can I do to make it easier to quit?  Quitting smoking might feel overwhelming at first, but there is a lot that you can do to make it easier. Take these important actions:  · Reach out to your family and friends and ask that they support and encourage you during this time. Call telephone quitlines, reach out to support groups, or work with a counselor for support.  · Ask people who smoke to avoid smoking around you.  · Avoid places that trigger you to smoke, such as bars, parties, or smoke-break areas at work.  · Spend time around people who do not smoke.  · Lessen stress in your life, because stress can be a smoking trigger for some people. To lessen stress, try:  ¨ Exercising regularly.  ¨ Deep-breathing  exercises.  ¨ Yoga.  ¨ Meditating.  ¨ Performing a body scan. This involves closing your eyes, scanning your body from head to toe, and noticing which parts of your body are particularly tense. Purposefully relax the muscles in those areas.  · Download or purchase mobile phone or tablet apps (applications) that can help you stick to your quit plan by providing reminders, tips, and encouragement. There are many free apps, such as QuitGuide from the CDC (Centers for Disease Control and Prevention). You can find other support for quitting smoking (smoking cessation) through smokefree[employer] and other websites.  How will I feel when I quit smoking?  Within the first 24 hours of quitting smoking, you may start to feel some withdrawal symptoms. These symptoms are usually most noticeable 2-3 days after quitting, but they usually do not last beyond 2-3 weeks. Changes or symptoms that you might experience include:  · Mood swings.  · Restlessness, anxiety, or irritation.  · Difficulty concentrating.  · Dizziness.  · Strong cravings for sugary foods in addition to nicotine.  · Mild weight gain.  · Constipation.  · Nausea.  · Coughing or a sore throat.  · Changes in how your medicines work in your body.  · A depressed mood.  · Difficulty sleeping (insomnia).  After the first 2-3 weeks of quitting, you may start to notice more positive results, such as:  · Improved sense of smell and taste.  · Decreased coughing and sore throat.  · Slower heart rate.  · Lower blood pressure.  · Clearer skin.  · The ability to breathe more easily.  · Fewer sick days.  Quitting smoking is very challenging for most people. Do not get discouraged if you are not successful the first time. Some people need to make many attempts to quit before they achieve long-term success. Do your best to stick to your quit plan, and talk with your health care provider if you have any questions or concerns.  This information is not intended to replace advice given to  you by your health care provider. Make sure you discuss any questions you have with your health care provider.  Document Released: 12/12/2002 Document Revised: 08/15/2017 Document Reviewed: 05/03/2016  [employer] Interactive Patient Education © 2017 [employer]

## 2019-04-25 ENCOUNTER — OFFICE VISIT (OUTPATIENT)
Dept: OTOLARYNGOLOGY | Facility: CLINIC | Age: 34
End: 2019-04-25

## 2019-04-25 VITALS
BODY MASS INDEX: 40.24 KG/M2 | TEMPERATURE: 98.1 F | HEIGHT: 61 IN | SYSTOLIC BLOOD PRESSURE: 138 MMHG | DIASTOLIC BLOOD PRESSURE: 85 MMHG | WEIGHT: 213.13 LBS | HEART RATE: 89 BPM

## 2019-04-25 DIAGNOSIS — E04.1 THYROID NODULE: ICD-10-CM

## 2019-04-25 DIAGNOSIS — R63.5 WEIGHT GAIN: ICD-10-CM

## 2019-04-25 DIAGNOSIS — R53.83 FATIGUE, UNSPECIFIED TYPE: ICD-10-CM

## 2019-04-25 DIAGNOSIS — E05.90 HYPERTHYROIDISM: Primary | ICD-10-CM

## 2019-04-25 PROCEDURE — 99214 OFFICE O/P EST MOD 30 MIN: CPT | Performed by: NURSE PRACTITIONER

## 2019-04-25 NOTE — PATIENT INSTRUCTIONS
Medical vs surgical options discussed    Discussed hemithyroidectomy - will concur with Dr Gil and make further recommendations based on his requests.      Will call for further directions  - discussed with Dr Gil who prefers right thyroidectomy    RIGHT THYROIDECTOMY: A RIGHT hemothyroidectomy was recommended. The risks and benefits were explained including but not limited to bleeding, infection, persistent and/or recurrent disease, risks of the general anesthesia, pain, recurrent laryngeal nerve injury with hoarseness and airway loss, parathyroid injury and hypocalcemia. Operative possibilities including hemithyroidectomy, total thyroidectomy and brice dissections were discussed. Possibilities for delayed need for total thyroidectomy pending pathologic diagnosis were also discussed. Alternatives were discussed. No guarantees were made or implied. Questions were asked appropriately answered.      Patient and family were instructed on the proper use of scheduled prescription drugs including their impact on driving and the potential effects during pregnancy.  The potential for overdose was discussed and their safe storage and proper disposal.  The website www.[employer].[place].gov which contains other materials in this regard.    Call for problems or worsening symptoms

## 2019-04-25 NOTE — PROGRESS NOTES
YOB: 1985  Location: [facility]  Location Address: [address] [place] [address], [facility], [address] [place] [address]  Location Phone: [phone]    Chief Complaint   Patient presents with   • thyroid nodule       History of Present Illness  Beverley Silverio is a 33 y.o. female.  Beverley Silverio is here for evaluation of ENT complaints. The patient has had problems with thyroid nodules and hyperthyroidism  The symptoms are not localized to a particular location. The patient has had moderate symptoms. The symptoms have been present for the last several years The symptoms are aggravated by  no identifiable factors. The symptoms are improved by no identifiable factors.  She reports anxiety, inability to lose weight/weight gain.  Denies globus sensation, heart palpitations.  Denies family history of thyroid cancer.  Has been hyperthyroid for several years with reports of variability.         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     TECHNIQUE: 517 uCi of I-123 was ingested and 4 and 24-hour iodine uptake  was calculated. 4 hour scan obtained.     FINDINGS:      The gland is within the upper limits of normal in size, right lobe  measuring approximately 5.7 cm in [id number] length and the left lobe  measuring 5.8 cm.     Within the upper pole of the right lobe there is a proximal 2 cm x 1.7  cm hot nodule suspected. Correlation with ultrasound findings  recommended. No additional  hot or cold lesions appreciated.     The 4 and 24-hour uptake is elevated, 4 hour uptake measures 22% and  24-hour uptake measures 39%.     Autonomous nodule considered.     IMPRESSION:  1. Elevated iodine uptake, 4 hour equals 22% and 24-hour uptake equals  39%.  2. 2 cm hot nodule right upper lobe, autonomous nodule considered.  Ultrasound correlation recommended.  This report was finalized on 2019 08:44 by Dr. Avelino Garcia MD.           3/19 TSH .16    Past Medical History:   Diagnosis Date   • Abnormal Pap smear of cervix    • Anxiety    • Menorrhagia with irregular cycle    • Migraine        Past Surgical History:   Procedure Laterality Date   •  SECTION     • D&C HYSTEROSCOPY ENDOMETRIAL ABLATION N/A 2017    Procedure: DILATATION AND CURETTAGE HYSTEROSCOPY NOVASURE ENDOMETRIAL ABLATION;  Surgeon: Jj Danielson MD;  Location: [facility] OR;  Service:    • TUBAL ABDOMINAL LIGATION         No outpatient medications have been marked as taking for the 19 encounter (Office Visit) with Gisele Coon APRN.       Sulfa antibiotics    Family History   Problem Relation Age of Onset   • Skin cancer Father    • No Known Problems Mother    • No Known Problems Sister    • No Known Problems Daughter    • Colon cancer Neg Hx    • Colon polyps Neg Hx        Social History     Socioeconomic History   • Marital status:      Spouse name: Not on file   • Number of children: Not on file   • Years of education: Not on file   • Highest education level: Not on file   Tobacco Use   • Smoking status: Former Smoker     Packs/day: 0.50     Years: 17.00     Pack years: 8.50     Types: Cigarettes   • Smokeless tobacco: Never Used   • Tobacco comment: Quite over a year ago   Substance and Sexual Activity   • Alcohol use: Yes   • Drug use: No   • Sexual activity: Not Currently     Partners: Male     Birth control/protection: None       Review of Systems   Constitutional: Positive for fatigue and unexpected weight  change.   HENT:        SEE HPI   Eyes: Negative.    Respiratory: Negative.    Cardiovascular: Negative.  Negative for palpitations.   Gastrointestinal: Negative.    Endocrine: Negative.    Genitourinary: Negative.    Musculoskeletal: Negative.    Skin: Negative.    Allergic/Immunologic: Negative.    Neurological: Negative.    Hematological: Negative.    Psychiatric/Behavioral: Negative.        Vitals:    04/25/19 1258   BP: 138/85   Pulse: 89   Temp: 98.1 °F (36.7 °C)       Body mass index is 40.27 kg/m².    Objective     Physical Exam  CONSTITUTIONAL: well nourished, overweight alert, oriented, in no acute distress     COMMUNICATION AND VOICE: able to communicate normally, normal voice quality    HEAD: normocephalic, no lesions, atraumatic, no tenderness, no masses     FACE: appearance normal, no lesions, no tenderness, no deformities, facial motion symmetric    SALIVARY GLANDS: parotid glands with no tenderness, no swelling, no masses, submandibular glands with normal size, nontender    EYES: ocular motility normal, eyelids normal, orbits normal, no proptosis, conjunctiva normal , pupils equal, round     EARS:  Hearing: response to conversational voice normal bilaterally   External Ears: auricles without lesions  Otoscopic: tympanic membrane appearance normal, no lesions, no perforation, normal mobility, no fluid    NOSE:  External Nose: structure normal, no tenderness on palpation, no nasal discharge, no lesions, no evidence of trauma, nostrils patent   Intranasal Exam: nasal mucosa normal, vestibule within normal limits, inferior turbinate normal, nasal septum midline     ORAL:  Lips: upper and lower lips without lesion   Teeth: dentition within normal limits for age   Gums: gingivae healthy   Oral Mucosa: oral mucosa normal, no mucosal lesions   Floor of Mouth: Warthin’s duct patent, mucosa normal  Tongue: lingual mucosa normal without lesions, normal tongue mobility   Palate: soft and hard palates with normal  mucosa and structure  Oropharynx: oropharyngeal mucosa normal    NECK: neck appearance normal, no mass,  noted without erythema or tenderness    THYROID: right lobe thyroid nodule  LYMPH NODES: no lymphadenopathy    CHEST/RESPIRATORY: respiratory effort normal     CARDIOVASCULAR: extremities without cyanosis or edema      NEUROLOGIC/PSYCHIATRIC: oriented to time, place and person, mood normal, affect appropriate, CN II-XII intact grossly    Assessment/Plan   Beverley was seen today for thyroid nodule.    Diagnoses and all orders for this visit:    Hyperthyroidism  -     Case Request; Standing  -     CBC and Differential; Future  -     Comprehensive metabolic panel; Future  -     APTT; Future  -     Protime-INR; Future  -     ECG 12 Lead; Future  -     XR chest 2 vw; Future  -     Case Request    Thyroid nodule - right  -     Case Request; Standing  -     CBC and Differential; Future  -     Comprehensive metabolic panel; Future  -     APTT; Future  -     Protime-INR; Future  -     ECG 12 Lead; Future  -     XR chest 2 vw; Future  -     Case Request    Weight gain  -     Case Request; Standing  -     CBC and Differential; Future  -     Comprehensive metabolic panel; Future  -     APTT; Future  -     Protime-INR; Future  -     ECG 12 Lead; Future  -     XR chest 2 vw; Future  -     Case Request    Fatigue, unspecified type  -     Case Request; Standing  -     CBC and Differential; Future  -     Comprehensive metabolic panel; Future  -     APTT; Future  -     Protime-INR; Future  -     ECG 12 Lead; Future  -     XR chest 2 vw; Future  -     Case Request    Other orders  -     Follow Anesthesia Guidelines / Standing Orders; Future  -     Provide NPO Instructions to Patient; Future  -     Follow Anesthesia Guidelines / Standing Orders; Standing  -     SCD (sequential compression device)- to be placed on patient in Pre-op; Standing  -     Verify / Perform Chlorhexidine Skin Prep if Indicated (If Not Already Completed);  Standing  -     Verify NPO Status; Standing  -     Obtain informed consent; Standing      RIGHT THYROIDECTOMY with laryngeal nerve monitor (Right)  Orders Placed This Encounter   Procedures   • XR chest 2 vw     Standing Status:   Future     Standing Expiration Date:   4/24/2020     Order Specific Question:   Reason for Exam:     Answer:   preop     Order Specific Question:   Patient Pregnant     Answer:   No   • Comprehensive metabolic panel     Standing Status:   Future     Standing Expiration Date:   4/24/2020   • APTT     Standing Status:   Future     Standing Expiration Date:   4/24/2020   • Protime-INR     Standing Status:   Future     Standing Expiration Date:   4/24/2020   • Follow Anesthesia Guidelines / Standing Orders     Standing Status:   Future   • Provide NPO Instructions to Patient     Standing Status:   Future   • ECG 12 Lead     Standing Status:   Future     Standing Expiration Date:   4/24/2020     Order Specific Question:   Reason for Exam:     Answer:   preop   • CBC and Differential     Standing Status:   Future     Standing Expiration Date:   4/24/2020     Order Specific Question:   Manual Differential     Answer:   No     Return in about 4 weeks (around 5/23/2019) for nick.       Patient Instructions   Medical vs surgical options discussed    Discussed hemithyroidectomy - will concur with Dr Gil and make further recommendations based on his requests.      Will call for further directions  - discussed with Dr Gil who prefers right thyroidectomy    RIGHT THYROIDECTOMY: A RIGHT hemothyroidectomy was recommended. The risks and benefits were explained including but not limited to bleeding, infection, persistent and/or recurrent disease, risks of the general anesthesia, pain, recurrent laryngeal nerve injury with hoarseness and airway loss, parathyroid injury and hypocalcemia. Operative possibilities including hemithyroidectomy, total thyroidectomy and brice dissections were discussed.  Possibilities for delayed need for total thyroidectomy pending pathologic diagnosis were also discussed. Alternatives were discussed. No guarantees were made or implied. Questions were asked appropriately answered.      Patient and family were instructed on the proper use of scheduled prescription drugs including their impact on driving and the potential effects during pregnancy.  The potential for overdose was discussed and their safe storage and proper disposal.  The website www.[employer].[place].gov which contains other materials in this regard.    Call for problems or worsening symptoms

## 2019-04-26 PROBLEM — E05.90 HYPERTHYROIDISM: Status: ACTIVE | Noted: 2019-04-26

## 2019-04-26 PROBLEM — R63.5 WEIGHT GAIN: Status: ACTIVE | Noted: 2019-04-26

## 2019-04-26 PROBLEM — R53.83 FATIGUE: Status: ACTIVE | Noted: 2019-04-26

## 2019-04-26 PROBLEM — E04.1 THYROID NODULE: Status: ACTIVE | Noted: 2019-04-26

## 2019-06-20 ENCOUNTER — APPOINTMENT (OUTPATIENT)
Dept: PREADMISSION TESTING | Facility: HOSPITAL | Age: 34
End: 2019-06-20

## 2019-06-20 ENCOUNTER — HOSPITAL ENCOUNTER (OUTPATIENT)
Dept: GENERAL RADIOLOGY | Facility: HOSPITAL | Age: 34
Discharge: HOME | End: 2019-06-20
Admitting: NURSE PRACTITIONER

## 2019-06-20 VITALS
WEIGHT: 209 LBS | OXYGEN SATURATION: 98 % | DIASTOLIC BLOOD PRESSURE: 55 MMHG | BODY MASS INDEX: 37.03 KG/M2 | HEIGHT: 63 IN | SYSTOLIC BLOOD PRESSURE: 137 MMHG | HEART RATE: 68 BPM | RESPIRATION RATE: 20 BRPM

## 2019-06-20 DIAGNOSIS — R53.83 FATIGUE, UNSPECIFIED TYPE: ICD-10-CM

## 2019-06-20 DIAGNOSIS — E05.90 HYPERTHYROIDISM: ICD-10-CM

## 2019-06-20 DIAGNOSIS — E04.1 THYROID NODULE: ICD-10-CM

## 2019-06-20 DIAGNOSIS — R63.5 WEIGHT GAIN: ICD-10-CM

## 2019-06-20 LAB
ALBUMIN SERPL-MCNC: 4.2 G/DL (ref 3.5–5)
ALBUMIN/GLOB SERPL: 1.4 G/DL (ref 1.1–2.5)
ALP SERPL-CCNC: 104 U/L (ref 24–120)
ALT SERPL W P-5'-P-CCNC: 25 U/L (ref 0–54)
ANION GAP SERPL CALCULATED.3IONS-SCNC: 8 MMOL/L (ref 4–13)
APTT PPP: 27 SECONDS (ref 24.1–35)
AST SERPL-CCNC: 24 U/L (ref 7–45)
BASOPHILS # BLD AUTO: 0.04 10*3/MM3 (ref 0–0.2)
BASOPHILS NFR BLD AUTO: 0.5 % (ref 0–2)
BILIRUB SERPL-MCNC: 0.3 MG/DL (ref 0.1–1)
BUN BLD-MCNC: 7 MG/DL (ref 5–21)
BUN/CREAT SERPL: 8.6 (ref 7–25)
CALCIUM SPEC-SCNC: 9.4 MG/DL (ref 8.4–10.4)
CHLORIDE SERPL-SCNC: 105 MMOL/L (ref 98–110)
CO2 SERPL-SCNC: 28 MMOL/L (ref 24–31)
CREAT BLD-MCNC: 0.81 MG/DL (ref 0.5–1.4)
DEPRECATED RDW RBC AUTO: 42.9 FL (ref 40–54)
EOSINOPHIL # BLD AUTO: 0.13 10*3/MM3 (ref 0–0.7)
EOSINOPHIL NFR BLD AUTO: 1.5 % (ref 0–4)
ERYTHROCYTE [DISTWIDTH] IN BLOOD BY AUTOMATED COUNT: 13.2 % (ref 12–15)
GFR SERPL CREATININE-BSD FRML MDRD: 80 ML/MIN/1.73
GLOBULIN UR ELPH-MCNC: 3.1 GM/DL
GLUCOSE BLD-MCNC: 88 MG/DL (ref 70–100)
HCT VFR BLD AUTO: 41.7 % (ref 37–47)
HGB BLD-MCNC: 13.8 G/DL (ref 12–16)
IMM GRANULOCYTES # BLD AUTO: 0.03 10*3/MM3 (ref 0–0.05)
IMM GRANULOCYTES NFR BLD AUTO: 0.3 % (ref 0–5)
INR PPP: 0.91 (ref 0.91–1.09)
LYMPHOCYTES # BLD AUTO: 2.71 10*3/MM3 (ref 0.72–4.86)
LYMPHOCYTES NFR BLD AUTO: 30.6 % (ref 15–45)
MCH RBC QN AUTO: 29.2 PG (ref 28–32)
MCHC RBC AUTO-ENTMCNC: 33.1 G/DL (ref 33–36)
MCV RBC AUTO: 88.3 FL (ref 82–98)
MONOCYTES # BLD AUTO: 0.6 10*3/MM3 (ref 0.19–1.3)
MONOCYTES NFR BLD AUTO: 6.8 % (ref 4–12)
NEUTROPHILS # BLD AUTO: 5.36 10*3/MM3 (ref 1.87–8.4)
NEUTROPHILS NFR BLD AUTO: 60.3 % (ref 39–78)
NRBC BLD AUTO-RTO: 0 /100 WBC (ref 0–0.2)
PLATELET # BLD AUTO: 345 10*3/MM3 (ref 130–400)
PMV BLD AUTO: 10.3 FL (ref 6–12)
POTASSIUM BLD-SCNC: 4.1 MMOL/L (ref 3.5–5.3)
PROT SERPL-MCNC: 7.3 G/DL (ref 6.3–8.7)
PROTHROMBIN TIME: 12.5 SECONDS (ref 11.9–14.6)
RBC # BLD AUTO: 4.72 10*6/MM3 (ref 4.2–5.4)
SODIUM BLD-SCNC: 141 MMOL/L (ref 135–145)
WBC NRBC COR # BLD: 8.87 10*3/MM3 (ref 4.8–10.8)

## 2019-06-20 PROCEDURE — 71046 X-RAY EXAM CHEST 2 VIEWS: CPT

## 2019-06-20 PROCEDURE — 85610 PROTHROMBIN TIME: CPT | Performed by: NURSE PRACTITIONER

## 2019-06-20 PROCEDURE — 93005 ELECTROCARDIOGRAM TRACING: CPT

## 2019-06-20 PROCEDURE — 85730 THROMBOPLASTIN TIME PARTIAL: CPT | Performed by: NURSE PRACTITIONER

## 2019-06-20 PROCEDURE — 93010 ELECTROCARDIOGRAM REPORT: CPT | Performed by: INTERNAL MEDICINE

## 2019-06-20 PROCEDURE — 85025 COMPLETE CBC W/AUTO DIFF WBC: CPT | Performed by: NURSE PRACTITIONER

## 2019-06-20 PROCEDURE — 36415 COLL VENOUS BLD VENIPUNCTURE: CPT

## 2019-06-20 PROCEDURE — 80053 COMPREHEN METABOLIC PANEL: CPT | Performed by: NURSE PRACTITIONER

## 2019-06-20 NOTE — DISCHARGE INSTRUCTIONS
DAY OF SURGERY INSTRUCTIONS        YOUR SURGEON: DR GOMEZ    PROCEDURE: ***RIGHT THYROIDECTOMY with laryngeal nerve monitor    DATE OF SURGERY: ***6/27/2019    ARRIVAL TIME: AS DIRECTED BY OFFICE    YOU MAY TAKE THE FOLLOWING MEDICATION(S) THE MORNING OF SURGERY WITH A SIP OF WATER: ***NO MEDS      ALL OTHER HOME MEDICATION CHECK WITH YOUR PHYSICIAN                MANAGING PAIN AFTER SURGERY    We know you are probably wondering what your pain will be like after surgery.  Following surgery it is unrealistic to expect you will not have pain.   Pain is how our bodies let us know that something is wrong or cautions us to be careful.  That said, our goal is to make your pain tolerable.    Methods we may use to treat your pain include (oral or IV medications, PCAs, epidurals, nerve blocks, etc.)   While some procedures require IV pain medications for a short time after surgery, transitioning to pain medications by mouth allows for better management of pain.   Your nurse will encourage you to take oral pain medications whenever possible.  IV medications work almost immediately, but only last a short while.  Taking medications by mouth allows for a more constant level of medication in your blood stream for a longer period of time.      Once your pain is out of control it is harder to get back under control.  It is important you are aware when your next dose of pain medication is due.  If you are admitted, your nurse may write the time of your next dose on the white board in your room to help you remember.      We are interested in your pain and encourage you to inform us about aggravating factors during your visit.   Many times a simple repositioning every few hours can make a big difference.    If your physician says it is okay, do not let your pain prevent you from getting out of bed. Be sure to call your nurse for assistance prior to getting up so you do not fall.      Before surgery, please decide your tolerable  pain goal.  These faces help describe the pain ratings we use on a 0-10 scale.   Be prepared to tell us your goal and whether or not you take pain or anxiety medications at home.          BEFORE YOU COME TO THE HOSPITAL  (Pre-op instructions)  • Do not eat, drink, smoke or chew gum after midnight the night before surgery.  This also includes no mints.  • Morning of surgery take only the medicines you have been instructed with a sip of water unless otherwise instructed  by your physician.  • Do not shave, wear makeup or dark nail polish.  • Remove all jewelry including rings.  • Leave anything you consider valuable at home.  • Leave your suitcase in the car until after your surgery.  • Bring the following with you if applicable:  o Picture ID and insurance, Medicare or Medicaid cards  o Co-pay/deductible required by insurance (cash, check, credit card)  o Copy of advance directive, living will or power-of- documents if not brought to PAT  o CPAP or BIPAP mask and tubing  o Relaxation aids (MP3 player, book, magazine)  • On the day of surgery check in at registration located at the main entrance of the hospital.       Outpatient Surgery Guidelines, Adult  Outpatient procedures are those for which the person having the procedure is allowed to go home the same day as the procedure. Various procedures are done on an outpatient basis. You should follow some general guidelines if you will be having an outpatient procedure.  LET YOUR HEALTH CARE PROVIDER KNOW ABOUT:  · Any allergies you have.  · All medicines you are taking, including vitamins, herbs, eye drops, creams, and over-the-counter medicines.  · Previous problems you or members of your family have had with the use of anesthetics.  · Any blood disorders you have.  · Previous surgeries you have had.  · Medical conditions you have.  RISKS AND COMPLICATIONS  Your health care provider will discuss possible risks and complications with you before surgery. Common  risks and complications include:    · Problems due to the use of anesthetics.  · Blood loss and replacement (does not apply to minor surgical procedures).  · Temporary increase in pain due to surgery.  · Uncorrected pain or problems that the surgery was meant to correct.  · Infection.  · New damage.  BEFORE THE PROCEDURE  · Ask your health care provider about changing or stopping your regular medicines. You may need to stop taking certain medicines in the days or weeks before the procedure.  · Stop smoking at least 2 weeks before surgery. This lowers your risk for complications during and after surgery. Ask your health care provider for help with this if needed.  · Eat your usual meals and a light supper the day before surgery. Continue fluid intake. Do not drink alcohol.  · Do not eat or drink after midnight the night before your surgery.   · Arrange for someone to take you home and to stay with you for 24 hours after the procedure. Medicine given for your procedure may affect your ability to drive or to care for yourself.  · Call your health care provider's office if you develop an illness or problem that may prevent you from safely having your procedure.  AFTER THE PROCEDURE  After surgery, you will be taken to a recovery area, where your progress will be monitored. If there are no complications, you will be allowed to go home when you are awake, stable, and taking fluids well. You may have numbness around the surgical site. Healing will take some time. You will have tenderness at the surgical site and may have some swelling and bruising. You may also have some nausea.  HOME CARE INSTRUCTIONS  · Do not drive for 24 hours, or as directed by your health care provider. Do not drive while taking prescription pain medicines.  · Do not drink alcohol for 24 hours.  · Do not make important decisions or sign legal documents for 24 hours.  · You may resume a normal diet and activities as directed.  · Do not lift anything  heavier than 10 pounds (4.5 kg) or play contact sports until your health care provider says it is okay.  · Change your bandages (dressings) as directed.  · Only take over-the-counter or prescription medicines as directed by your health care provider.  · Follow up with your health care provider as directed.  SEEK MEDICAL CARE IF:  · You have increased bleeding (more than a small spot) from the surgical site.  · You have redness, swelling, or increasing pain in the wound.  · You see pus coming from the wound.  · You have a fever.  · You notice a bad smell coming from the wound or dressing.  · You feel lightheaded or faint.  · You develop a rash.  · You have trouble breathing.  · You develop allergies.  MAKE SURE YOU:  · Understand these instructions.  · Will watch your condition.  · Will get help right away if you are not doing well or get worse.     This information is not intended to replace advice given to you by your health care provider. Make sure you discuss any questions you have with your health care provider.     Document Released: 09/12/2002 Document Revised: 05/03/2016 Document Reviewed: 05/22/2014  [employer] Interactive Patient Education ©2016 [employer]       Fall Prevention in Hospitals, Adult  As a hospital patient, your condition and the treatments you receive can increase your risk for falls. Some additional risk factors for falls in a hospital include:  · Being in an unfamiliar environment.  · Being on bed rest.  · Your surgery.  · Taking certain medicines.  · Your tubing requirements, such as intravenous (IV) therapy or catheters.  It is important that you learn how to decrease fall risks while at the hospital. Below are important tips that can help prevent falls.  SAFETY TIPS FOR PREVENTING FALLS  Talk about your risk of falling.  · Ask your health care provider why you are at risk for falling. Is it your medicine, illness, tubing placement, or something else?  · Make a plan with your health care  provider to keep you safe from falls.  · Ask your health care provider or pharmacist about side effects of your medicines. Some medicines can make you dizzy or affect your coordination.  Ask for help.  · Ask for help before getting out of bed. You may need to press your call button.  · Ask for assistance in getting safely to the toilet.  · Ask for a walker or cane to be put at your bedside. Ask that most of the side rails on your bed be placed up before your health care provider leaves the room.  · Ask family or friends to sit with you.  · Ask for things that are out of your reach, such as your glasses, hearing aids, telephone, bedside table, or call button.  Follow these tips to avoid falling:  · Stay lying or seated, rather than standing, while waiting for help.  · Wear rubber-soled slippers or shoes whenever you walk in the hospital.  · Avoid quick, sudden movements.  ¨ Change positions slowly.  ¨ Sit on the side of your bed before standing.  ¨ Stand up slowly and wait before you start to walk.  · Let your health care provider know if there is a spill on the floor.  · Pay careful attention to the medical equipment, electrical cords, and tubes around you.  · When you need help, use your call button by your bed or in the bathroom. Wait for one of your health care providers to help you.  · If you feel dizzy or unsure of your footing, return to bed and wait for assistance.  · Avoid being distracted by the TV, telephone, or another person in your room.  · Do not lean or support yourself on rolling objects, such as IV poles or bedside tables.     This information is not intended to replace advice given to you by your health care provider. Make sure you discuss any questions you have with your health care provider.     Document Released: 12/15/2001 Document Revised: 01/08/2016 Document Reviewed: 08/25/2013  [employer] Interactive Patient Education ©2016 [employer]       Surgical Site Infections FAQs  What is a Surgical  Site Infection (SSI)?  A surgical site infection is an infection that occurs after surgery in the part of the body where the surgery took place. Most patients who have surgery do not develop an infection. However, infections develop in about 1 to 3 out of every 100 patients who have surgery.  Some of the common symptoms of a surgical site infection are:  · Redness and pain around the area where you had surgery  · Drainage of cloudy fluid from your surgical wound  · Fever  Can SSIs be treated?  Yes. Most surgical site infections can be treated with antibiotics. The antibiotic given to you depends on the bacteria (germs) causing the infection. Sometimes patients with SSIs also need another surgery to treat the infection.  What are some of the things that hospitals are doing to prevent SSIs?  To prevent SSIs, doctors, nurses, and other healthcare providers:  · Clean their hands and arms up to their elbows with an antiseptic agent just before the surgery.  · Clean their hands with soap and water or an alcohol-based hand rub before and after caring for each patient.  · May remove some of your hair immediately before your surgery using electric clippers if the hair is in the same area where the procedure will occur. They should not shave you with a razor.  · Wear special hair covers, masks, gowns, and gloves during surgery to keep the surgery area clean.  · Give you antibiotics before your surgery starts. In most cases, you should get antibiotics within 60 minutes before the surgery starts and the antibiotics should be stopped within 24 hours after surgery.  · Clean the skin at the site of your surgery with a special soap that kills germs.  What can I do to help prevent SSIs?  Before your surgery:  · Tell your doctor about other medical problems you may have. Health problems such as allergies, diabetes, and obesity could affect your surgery and your treatment.  · Quit smoking. Patients who smoke get more infections. Talk  to your doctor about how you can quit before your surgery.  · Do not shave near where you will have surgery. Shaving with a razor can irritate your skin and make it easier to develop an infection.  At the time of your surgery:  · Speak up if someone tries to shave you with a razor before surgery. Ask why you need to be shaved and talk with your surgeon if you have any concerns.  · Ask if you will get antibiotics before surgery.  After your surgery:  · Make sure that your healthcare providers clean their hands before examining you, either with soap and water or an alcohol-based hand rub.  · If you do not see your providers clean their hands, please ask them to do so.  · Family and friends who visit you should not touch the surgical wound or dressings.  · Family and friends should clean their hands with soap and water or an alcohol-based hand rub before and after visiting you. If you do not see them clean their hands, ask them to clean their hands.  What do I need to do when I go home from the hospital?  · Before you go home, your doctor or nurse should explain everything you need to know about taking care of your wound. Make sure you understand how to care for your wound before you leave the hospital.  · Always clean your hands before and after caring for your wound.  · Before you go home, make sure you know who to contact if you have questions or problems after you get home.  · If you have any symptoms of an infection, such as redness and pain at the surgery site, drainage, or fever, call your doctor immediately.  If you have additional questions, please ask your doctor or nurse.  Developed and co-sponsored by The Society for Healthcare Epidemiology of [place] (SHEA); Infectious Diseases Society of [place] (IDSA); American Hospital Association; Association for Professionals in Infection Control and Epidemiology (APIC); Centers for Disease Control and Prevention (CDC); and The Joint Commission.     This information  is not intended to replace advice given to you by your health care provider. Make sure you discuss any questions you have with your health care provider.     Document Released: 12/23/2014 Document Revised: 01/08/2016 Document Reviewed: 03/02/2016  [employer] Interactive Patient Education ©2016 [employer]       [facility]  CHG 4% Patient Instruction Sheet    Preparing the Skin Before Surgery  Preparing or “prepping” skin before surgery can reduce the risk of infection at the surgical site. To make the process easier,[facility] has chosen 4% Chlorhexidine Gluconate (CHG) antiseptic solution.   The steps below outline the prepping process and should be carefully followed.                                                                                                                                                      Prep the skin at the following time(s):                                                      We recommend you shower the night before surgery, and again the morning of surgery with the 4% CHG antiseptic solution using half of the bottle and a cloth each time.  Dress in clean clothes/sleepwear after showering.  See instructions below for application.          Do not apply any lotions or moisturizers.       Do not shave the area to be prepped for at least 2 days prior to surgery.    Clipping the hair may be done immediately prior to your surgery at the hospital    if needed.    Directions:  Thoroughly rinse your body with water.  Apply 4% CHG to a cloth and wash skin gently, paying special attention to the operative site.  Rinse again thoroughly.  Once you have begun using this product do not apply anything else to your skin. If itching or redness persists, rinse affected areas and discontinue use.    When using this product:  • Keep out of eyes, ears, and mouth.  • If solution should contact these areas, rinse out promptly and thoroughly with water.  • For external use only.  • Do not use in genital  area, on your face or head.      PATIENT/FAMILY/RESPONSIBLE PARTY VERBALIZES UNDERSTANDING OF ABOVE EDUCATION.  COPY OF PAIN SCALE GIVEN AND REVIEWED WITH VERBALIZED UNDERSTANDING.

## 2019-06-24 ENCOUNTER — TELEPHONE (OUTPATIENT)
Dept: OTOLARYNGOLOGY | Facility: CLINIC | Age: 34
End: 2019-06-24

## 2019-06-24 ENCOUNTER — APPOINTMENT (OUTPATIENT)
Dept: LAB | Facility: HOSPITAL | Age: 34
End: 2019-06-24

## 2019-06-24 DIAGNOSIS — E05.90 HYPERTHYROIDISM: Primary | ICD-10-CM

## 2019-06-24 PROCEDURE — 84436 ASSAY OF TOTAL THYROXINE: CPT | Performed by: OTOLARYNGOLOGY

## 2019-06-24 PROCEDURE — 84480 ASSAY TRIIODOTHYRONINE (T3): CPT | Performed by: OTOLARYNGOLOGY

## 2019-06-24 PROCEDURE — 84481 FREE ASSAY (FT-3): CPT | Performed by: OTOLARYNGOLOGY

## 2019-06-24 PROCEDURE — 84445 ASSAY OF TSI GLOBULIN: CPT | Performed by: OTOLARYNGOLOGY

## 2019-06-24 PROCEDURE — 36415 COLL VENOUS BLD VENIPUNCTURE: CPT

## 2019-06-24 PROCEDURE — 86376 MICROSOMAL ANTIBODY EACH: CPT | Performed by: OTOLARYNGOLOGY

## 2019-06-24 PROCEDURE — 84443 ASSAY THYROID STIM HORMONE: CPT | Performed by: OTOLARYNGOLOGY

## 2019-06-24 NOTE — TELEPHONE ENCOUNTER
Called and left patient a message letting her know there were labs to have done prior to surgery.

## 2019-06-25 LAB
T3 SERPL-MCNC: 155 NG/DL (ref 71–180)
T3FREE SERPL-MCNC: 3.6 PG/ML (ref 2–4.4)
T4 SERPL-MCNC: 9.5 UG/DL (ref 4.5–12)
THYROPEROXIDASE AB SERPL-ACNC: 7 IU/ML (ref 0–34)
TSH SERPL-ACNC: 0.11 UIU/ML (ref 0.45–4.5)

## 2019-06-26 LAB — TSI SER-MCNC: 0.14 IU/L (ref 0–0.55)

## 2019-06-27 ENCOUNTER — ANESTHESIA (OUTPATIENT)
Dept: PERIOP | Facility: HOSPITAL | Age: 34
End: 2019-06-27

## 2019-06-27 ENCOUNTER — HOSPITAL ENCOUNTER (OUTPATIENT)
Facility: HOSPITAL | Age: 34
Setting detail: SURGERY ADMIT
Discharge: HOME | End: 2019-06-27
Attending: OTOLARYNGOLOGY | Admitting: OTOLARYNGOLOGY

## 2019-06-27 ENCOUNTER — ANESTHESIA EVENT (OUTPATIENT)
Dept: PERIOP | Facility: HOSPITAL | Age: 34
End: 2019-06-27

## 2019-06-27 ENCOUNTER — NURSE TRIAGE (OUTPATIENT)
Dept: CALL CENTER | Facility: HOSPITAL | Age: 34
End: 2019-06-27

## 2019-06-27 VITALS
RESPIRATION RATE: 16 BRPM | SYSTOLIC BLOOD PRESSURE: 109 MMHG | DIASTOLIC BLOOD PRESSURE: 57 MMHG | OXYGEN SATURATION: 96 % | HEART RATE: 99 BPM | TEMPERATURE: 97.8 F

## 2019-06-27 DIAGNOSIS — R53.83 FATIGUE, UNSPECIFIED TYPE: ICD-10-CM

## 2019-06-27 DIAGNOSIS — E04.1 THYROID NODULE: ICD-10-CM

## 2019-06-27 DIAGNOSIS — E05.90 HYPERTHYROIDISM: ICD-10-CM

## 2019-06-27 DIAGNOSIS — R63.5 WEIGHT GAIN: ICD-10-CM

## 2019-06-27 DIAGNOSIS — E89.0 S/P PARTIAL THYROIDECTOMY: Primary | ICD-10-CM

## 2019-06-27 LAB — B-HCG UR QL: NEGATIVE

## 2019-06-27 PROCEDURE — 25010000002 DEXAMETHASONE PER 1 MG: Performed by: NURSE ANESTHETIST, CERTIFIED REGISTERED

## 2019-06-27 PROCEDURE — S0260 H&P FOR SURGERY: HCPCS | Performed by: OTOLARYNGOLOGY

## 2019-06-27 PROCEDURE — 25010000002 DEXAMETHASONE PER 1 MG: Performed by: ANESTHESIOLOGY

## 2019-06-27 PROCEDURE — 25010000002 METOCLOPRAMIDE PER 10 MG: Performed by: ANESTHESIOLOGY

## 2019-06-27 PROCEDURE — 25010000002 PROPOFOL 10 MG/ML EMULSION: Performed by: NURSE ANESTHETIST, CERTIFIED REGISTERED

## 2019-06-27 PROCEDURE — 88307 TISSUE EXAM BY PATHOLOGIST: CPT | Performed by: OTOLARYNGOLOGY

## 2019-06-27 PROCEDURE — 60220 PARTIAL REMOVAL OF THYROID: CPT | Performed by: OTOLARYNGOLOGY

## 2019-06-27 PROCEDURE — 25010000002 ONDANSETRON PER 1 MG: Performed by: NURSE ANESTHETIST, CERTIFIED REGISTERED

## 2019-06-27 PROCEDURE — 25010000002 MIDAZOLAM PER 1 MG: Performed by: ANESTHESIOLOGY

## 2019-06-27 PROCEDURE — 25010000002 ONDANSETRON PER 1 MG: Performed by: ANESTHESIOLOGY

## 2019-06-27 PROCEDURE — 81025 URINE PREGNANCY TEST: CPT | Performed by: OTOLARYNGOLOGY

## 2019-06-27 PROCEDURE — 25010000002 SUCCINYLCHOLINE PER 20 MG: Performed by: NURSE ANESTHETIST, CERTIFIED REGISTERED

## 2019-06-27 RX ORDER — PROPOFOL 10 MG/ML
VIAL (ML) INTRAVENOUS AS NEEDED
Status: DISCONTINUED | OUTPATIENT
Start: 2019-06-27 | End: 2019-06-27 | Stop reason: SURG

## 2019-06-27 RX ORDER — LABETALOL HYDROCHLORIDE 5 MG/ML
5 INJECTION, SOLUTION INTRAVENOUS
Status: DISCONTINUED | OUTPATIENT
Start: 2019-06-27 | End: 2019-06-27 | Stop reason: HOSPADM

## 2019-06-27 RX ORDER — DEXAMETHASONE SODIUM PHOSPHATE 4 MG/ML
INJECTION, SOLUTION INTRA-ARTICULAR; INTRALESIONAL; INTRAMUSCULAR; INTRAVENOUS; SOFT TISSUE AS NEEDED
Status: DISCONTINUED | OUTPATIENT
Start: 2019-06-27 | End: 2019-06-27 | Stop reason: SURG

## 2019-06-27 RX ORDER — FENTANYL CITRATE 50 UG/ML
25 INJECTION, SOLUTION INTRAMUSCULAR; INTRAVENOUS AS NEEDED
Status: DISCONTINUED | OUTPATIENT
Start: 2019-06-27 | End: 2019-06-27 | Stop reason: HOSPADM

## 2019-06-27 RX ORDER — VASOPRESSIN 20 U/ML
INJECTION PARENTERAL AS NEEDED
Status: DISCONTINUED | OUTPATIENT
Start: 2019-06-27 | End: 2019-06-27 | Stop reason: SURG

## 2019-06-27 RX ORDER — IPRATROPIUM BROMIDE AND ALBUTEROL SULFATE 2.5; .5 MG/3ML; MG/3ML
3 SOLUTION RESPIRATORY (INHALATION) ONCE AS NEEDED
Status: DISCONTINUED | OUTPATIENT
Start: 2019-06-27 | End: 2019-06-27 | Stop reason: HOSPADM

## 2019-06-27 RX ORDER — ONDANSETRON 2 MG/ML
4 INJECTION INTRAMUSCULAR; INTRAVENOUS ONCE AS NEEDED
Status: DISCONTINUED | OUTPATIENT
Start: 2019-06-27 | End: 2019-06-27 | Stop reason: HOSPADM

## 2019-06-27 RX ORDER — MIDAZOLAM HYDROCHLORIDE 1 MG/ML
1 INJECTION INTRAMUSCULAR; INTRAVENOUS
Status: DISCONTINUED | OUTPATIENT
Start: 2019-06-27 | End: 2019-06-27 | Stop reason: HOSPADM

## 2019-06-27 RX ORDER — METOCLOPRAMIDE HYDROCHLORIDE 5 MG/ML
5 INJECTION INTRAMUSCULAR; INTRAVENOUS
Status: DISCONTINUED | OUTPATIENT
Start: 2019-06-27 | End: 2019-06-27 | Stop reason: HOSPADM

## 2019-06-27 RX ORDER — SUCCINYLCHOLINE CHLORIDE 20 MG/ML
INJECTION INTRAMUSCULAR; INTRAVENOUS AS NEEDED
Status: DISCONTINUED | OUTPATIENT
Start: 2019-06-27 | End: 2019-06-27 | Stop reason: SURG

## 2019-06-27 RX ORDER — FAMOTIDINE 10 MG/ML
20 INJECTION, SOLUTION INTRAVENOUS
Status: COMPLETED | OUTPATIENT
Start: 2019-06-27 | End: 2019-06-27

## 2019-06-27 RX ORDER — ONDANSETRON 2 MG/ML
INJECTION INTRAMUSCULAR; INTRAVENOUS AS NEEDED
Status: DISCONTINUED | OUTPATIENT
Start: 2019-06-27 | End: 2019-06-27 | Stop reason: SURG

## 2019-06-27 RX ORDER — DEXAMETHASONE SODIUM PHOSPHATE 4 MG/ML
4 INJECTION, SOLUTION INTRA-ARTICULAR; INTRALESIONAL; INTRAMUSCULAR; INTRAVENOUS; SOFT TISSUE ONCE AS NEEDED
Status: COMPLETED | OUTPATIENT
Start: 2019-06-27 | End: 2019-06-27

## 2019-06-27 RX ORDER — ONDANSETRON 2 MG/ML
4 INJECTION INTRAMUSCULAR; INTRAVENOUS AS NEEDED
Status: DISCONTINUED | OUTPATIENT
Start: 2019-06-27 | End: 2019-06-27 | Stop reason: HOSPADM

## 2019-06-27 RX ORDER — PHENYLEPHRINE HCL IN 0.9% NACL 0.8MG/10ML
SYRINGE (ML) INTRAVENOUS AS NEEDED
Status: DISCONTINUED | OUTPATIENT
Start: 2019-06-27 | End: 2019-06-27 | Stop reason: SURG

## 2019-06-27 RX ORDER — SODIUM CHLORIDE, SODIUM LACTATE, POTASSIUM CHLORIDE, CALCIUM CHLORIDE 600; 310; 30; 20 MG/100ML; MG/100ML; MG/100ML; MG/100ML
100 INJECTION, SOLUTION INTRAVENOUS CONTINUOUS
Status: DISCONTINUED | OUTPATIENT
Start: 2019-06-27 | End: 2019-06-27 | Stop reason: HOSPADM

## 2019-06-27 RX ORDER — LIDOCAINE HYDROCHLORIDE AND EPINEPHRINE 10; 10 MG/ML; UG/ML
INJECTION, SOLUTION INFILTRATION; PERINEURAL AS NEEDED
Status: DISCONTINUED | OUTPATIENT
Start: 2019-06-27 | End: 2019-06-27 | Stop reason: HOSPADM

## 2019-06-27 RX ORDER — HYDROCODONE BITARTRATE AND ACETAMINOPHEN 5; 325 MG/1; MG/1
1 TABLET ORAL ONCE AS NEEDED
Status: DISCONTINUED | OUTPATIENT
Start: 2019-06-27 | End: 2019-06-27 | Stop reason: HOSPADM

## 2019-06-27 RX ORDER — SUFENTANIL CITRATE 50 UG/ML
INJECTION EPIDURAL; INTRAVENOUS AS NEEDED
Status: DISCONTINUED | OUTPATIENT
Start: 2019-06-27 | End: 2019-06-27 | Stop reason: SURG

## 2019-06-27 RX ORDER — SODIUM CHLORIDE, SODIUM LACTATE, POTASSIUM CHLORIDE, CALCIUM CHLORIDE 600; 310; 30; 20 MG/100ML; MG/100ML; MG/100ML; MG/100ML
1000 INJECTION, SOLUTION INTRAVENOUS CONTINUOUS
Status: DISCONTINUED | OUTPATIENT
Start: 2019-06-27 | End: 2019-06-27 | Stop reason: HOSPADM

## 2019-06-27 RX ORDER — MIDAZOLAM HYDROCHLORIDE 1 MG/ML
2 INJECTION INTRAMUSCULAR; INTRAVENOUS
Status: DISCONTINUED | OUTPATIENT
Start: 2019-06-27 | End: 2019-06-27 | Stop reason: HOSPADM

## 2019-06-27 RX ORDER — FLUMAZENIL 0.1 MG/ML
0.2 INJECTION INTRAVENOUS AS NEEDED
Status: DISCONTINUED | OUTPATIENT
Start: 2019-06-27 | End: 2019-06-27 | Stop reason: HOSPADM

## 2019-06-27 RX ORDER — HYDRALAZINE HYDROCHLORIDE 20 MG/ML
5 INJECTION INTRAMUSCULAR; INTRAVENOUS
Status: DISCONTINUED | OUTPATIENT
Start: 2019-06-27 | End: 2019-06-27 | Stop reason: HOSPADM

## 2019-06-27 RX ORDER — SODIUM CHLORIDE 0.9 % (FLUSH) 0.9 %
3 SYRINGE (ML) INJECTION AS NEEDED
Status: DISCONTINUED | OUTPATIENT
Start: 2019-06-27 | End: 2019-06-27 | Stop reason: HOSPADM

## 2019-06-27 RX ORDER — OXYCODONE AND ACETAMINOPHEN 10; 325 MG/1; MG/1
1 TABLET ORAL ONCE AS NEEDED
Status: COMPLETED | OUTPATIENT
Start: 2019-06-27 | End: 2019-06-27

## 2019-06-27 RX ORDER — ACETAMINOPHEN 325 MG/1
650 TABLET ORAL EVERY 4 HOURS PRN
COMMUNITY
Start: 2019-06-27 | End: 2019-07-23

## 2019-06-27 RX ORDER — SODIUM CHLORIDE 0.9 % (FLUSH) 0.9 %
1-10 SYRINGE (ML) INJECTION AS NEEDED
Status: DISCONTINUED | OUTPATIENT
Start: 2019-06-27 | End: 2019-06-27 | Stop reason: HOSPADM

## 2019-06-27 RX ORDER — MORPHINE SULFATE 2 MG/ML
2 INJECTION, SOLUTION INTRAMUSCULAR; INTRAVENOUS
Status: DISCONTINUED | OUTPATIENT
Start: 2019-06-27 | End: 2019-06-27 | Stop reason: HOSPADM

## 2019-06-27 RX ORDER — NALOXONE HCL 0.4 MG/ML
0.04 VIAL (ML) INJECTION AS NEEDED
Status: DISCONTINUED | OUTPATIENT
Start: 2019-06-27 | End: 2019-06-27 | Stop reason: HOSPADM

## 2019-06-27 RX ORDER — SODIUM CHLORIDE 0.9 % (FLUSH) 0.9 %
3 SYRINGE (ML) INJECTION EVERY 12 HOURS SCHEDULED
Status: DISCONTINUED | OUTPATIENT
Start: 2019-06-27 | End: 2019-06-27 | Stop reason: HOSPADM

## 2019-06-27 RX ORDER — HYDROCODONE BITARTRATE AND ACETAMINOPHEN 5; 325 MG/1; MG/1
1-2 TABLET ORAL EVERY 4 HOURS PRN
Qty: 15 TABLET | Refills: 0 | Status: SHIPPED | OUTPATIENT
Start: 2019-06-27 | End: 2019-06-30

## 2019-06-27 RX ORDER — LIDOCAINE HYDROCHLORIDE 40 MG/ML
SOLUTION TOPICAL AS NEEDED
Status: DISCONTINUED | OUTPATIENT
Start: 2019-06-27 | End: 2019-06-27 | Stop reason: SURG

## 2019-06-27 RX ORDER — ACETAMINOPHEN 500 MG
1000 TABLET ORAL ONCE
Status: COMPLETED | OUTPATIENT
Start: 2019-06-27 | End: 2019-06-27

## 2019-06-27 RX ADMIN — DEXAMETHASONE SODIUM PHOSPHATE 4 MG: 4 INJECTION, SOLUTION INTRAMUSCULAR; INTRAVENOUS at 12:14

## 2019-06-27 RX ADMIN — FAMOTIDINE 20 MG: 10 INJECTION INTRAVENOUS at 11:06

## 2019-06-27 RX ADMIN — VASOPRESSIN 1 UNITS: 20 INJECTION INTRAVENOUS at 12:33

## 2019-06-27 RX ADMIN — SUFENTANIL CITRATE 50 MCG: 50 INJECTION, SOLUTION EPIDURAL; INTRAVENOUS at 12:14

## 2019-06-27 RX ADMIN — VASOPRESSIN 1 UNITS: 20 INJECTION INTRAVENOUS at 12:20

## 2019-06-27 RX ADMIN — OXYCODONE HYDROCHLORIDE AND ACETAMINOPHEN 1 TABLET: 10; 325 TABLET ORAL at 14:32

## 2019-06-27 RX ADMIN — ONDANSETRON 4 MG: 2 INJECTION INTRAMUSCULAR; INTRAVENOUS at 14:09

## 2019-06-27 RX ADMIN — Medication 160 MCG: at 12:19

## 2019-06-27 RX ADMIN — METOCLOPRAMIDE 5 MG: 5 INJECTION, SOLUTION INTRAMUSCULAR; INTRAVENOUS at 14:16

## 2019-06-27 RX ADMIN — PROPOFOL 120 MG: 10 INJECTION, EMULSION INTRAVENOUS at 12:14

## 2019-06-27 RX ADMIN — Medication 320 MCG: at 12:27

## 2019-06-27 RX ADMIN — SUCCINYLCHOLINE CHLORIDE 100 MG: 20 INJECTION, SOLUTION INTRAMUSCULAR; INTRAVENOUS at 12:14

## 2019-06-27 RX ADMIN — MIDAZOLAM 2 MG: 1 INJECTION INTRAMUSCULAR; INTRAVENOUS at 11:11

## 2019-06-27 RX ADMIN — ONDANSETRON HYDROCHLORIDE 4 MG: 2 SOLUTION INTRAMUSCULAR; INTRAVENOUS at 12:14

## 2019-06-27 RX ADMIN — SODIUM CHLORIDE, POTASSIUM CHLORIDE, SODIUM LACTATE AND CALCIUM CHLORIDE 1000 ML: 600; 310; 30; 20 INJECTION, SOLUTION INTRAVENOUS at 09:17

## 2019-06-27 RX ADMIN — ACETAMINOPHEN 1000 MG: 500 TABLET, FILM COATED ORAL at 11:05

## 2019-06-27 RX ADMIN — Medication 320 MCG: at 12:38

## 2019-06-27 RX ADMIN — DEXAMETHASONE SODIUM PHOSPHATE 4 MG: 4 INJECTION, SOLUTION INTRAMUSCULAR; INTRAVENOUS at 11:05

## 2019-06-27 RX ADMIN — LIDOCAINE HYDROCHLORIDE 1 EACH: 40 SOLUTION TOPICAL at 12:14

## 2019-06-27 NOTE — ANESTHESIA POSTPROCEDURE EVALUATION
Patient: Beverley Silverio    Procedure Summary     Date:  06/27/19 Room / Location:   [facility] OR 02 /  [facility]    Anesthesia Start:  1211 Anesthesia Stop:  1355    Procedure:  RIGHT THYROIDECTOMY with laryngeal nerve monitor (Right Neck) Diagnosis:       Hyperthyroidism      Thyroid nodule      Weight gain      Fatigue, unspecified type      (Hyperthyroidism [E05.90])      (Thyroid nodule [E04.1])      (Weight gain [R63.5])      (Fatigue, unspecified type [R53.83])    Surgeon:  yCrus Gil MD Provider:  Cyrus Mosley CRNA    Anesthesia Type:  general ASA Status:  2          Anesthesia Type: general  Last vitals  BP   121/60 (06/27/19 1436)   Temp   97.8 °F (36.6 °C) (06/27/19 1351)   Pulse   101 (06/27/19 1436)   Resp   15 (06/27/19 1436)     SpO2   94 % (06/27/19 1436)     Post Anesthesia Care and Evaluation    Patient location during evaluation: PACU  Patient participation: complete - patient participated  Level of consciousness: awake and alert  Pain management: adequate  Airway patency: patent  Anesthetic complications: No anesthetic complications    Cardiovascular status: acceptable  Respiratory status: acceptable  Hydration status: acceptable    Comments: Blood pressure 121/60, pulse 101, temperature 97.8 °F (36.6 °C), temperature source Temporal, resp. rate 15, last menstrual period 06/12/2019, SpO2 94 %, not currently breastfeeding.    Pt discharged from PACU based on estelita score >8

## 2019-06-27 NOTE — ANESTHESIA PROCEDURE NOTES
Airway  Urgency: elective    Airway not difficult    General Information and Staff    Patient location during procedure: OR  CRNA: Cyrus Mosley CRNA    Indications and Patient Condition  Indications for airway management: airway protection    Preoxygenated: yes  MILS maintained throughout  Mask difficulty assessment: 1 - vent by mask    Final Airway Details  Final airway type: endotracheal airway      Successful airway: ETT and NIM tube  Cuffed: yes   Successful intubation technique: direct laryngoscopy  Facilitating devices/methods: intubating stylet  Endotracheal tube insertion site: oral  Blade: Amber  Blade size: 3  ETT size (mm): 7.0  Cormack-Lehane Classification: grade I - full view of glottis  Placement verified by: chest auscultation and capnometry   Cuff volume (mL): 8  Measured from: lips  ETT to lips (cm): 21  Number of attempts at approach: 1

## 2019-06-27 NOTE — ANESTHESIA PREPROCEDURE EVALUATION
Anesthesia Evaluation     Patient summary reviewed and Nursing notes reviewed   no history of anesthetic complications:  NPO Solid Status: > 8 hours  NPO Liquid Status: > 8 hours           Airway   Mallampati: I  TM distance: >3 FB  Neck ROM: full  No difficulty expected  Dental      Pulmonary    (+) a smoker Former,   Cardiovascular   Exercise tolerance: good (4-7 METS)        Neuro/Psych  (+) headaches, psychiatric history Anxiety,     GI/Hepatic/Renal/Endo    (+) obesity,   hyperthyroidism    Musculoskeletal     Abdominal    Substance History      OB/GYN          Other                        Anesthesia Plan    ASA 2     general     intravenous induction   Anesthetic plan, all risks, benefits, and alternatives have been provided, discussed and informed consent has been obtained with: patient.

## 2019-06-28 NOTE — TELEPHONE ENCOUNTER
"Mom wants to know if there is a problem since she had thyroid surgery today and is having nausea. She did vomit once. She was instructed to call surgeon for further.     Reason for Disposition  • [1] Caller has URGENT question AND [2] triager unable to answer question    Additional Information  • Negative: Sounds like a life-threatening emergency to the triager  • Negative: Chest pain  • Negative: Difficulty breathing  • Negative: Surgical incision symptoms and questions  • Negative: [1] Discomfort (pain, burning or stinging) when passing urine AND [2] male  • Negative: [1] Discomfort (pain, burning or stinging) when passing urine AND [2] female  • Negative: Constipation  • Negative: New or worsening leg (calf, thigh) pain  • Negative: New or worsening leg swelling  • Negative: Dizziness is severe, or persists > 24 hours after surgery  • Negative: Pain, redness, swelling, or pus at IV Site  • Negative: Symptoms arising from use of a urinary catheter (Gatica or Coude)  • Negative: Cast problems or questions  • Negative: Medication question  • Negative: [1] Widespread rash AND [2] bright red, sunburn-like  • Negative: [1] SEVERE headache AND [2] after spinal (epidural) anesthesia  • Negative: [1] Vomiting AND [2] persists > 4 hours  • Negative: [1] Vomiting AND [2] abdomen looks much more swollen than usual  • Negative: [1] Drinking very little AND [2] dehydration suspected (e.g., no urine > 12 hours, very dry mouth, very lightheaded)  • Negative: Patient sounds very sick or weak to the triager  • Negative: Sounds like a serious complication to the triager  • Negative: Fever > 100.5 F (38.1 C)  • Negative: [1] SEVERE post-op pain (e.g., excruciating, pain scale 8-10) AND [2] not controlled with pain medications    Answer Assessment - Initial Assessment Questions  1. SYMPTOM: \"What's the main symptom you're concerned about?\" (e.g., pain, fever, vomiting)      Nausea/ vomited once   2. ONSET: \"When did ________  " "start?\"      Today   3. SURGERY: \"What surgery was performed?\"      Partial thyroidectomy   4. DATE of SURGERY: \"When was surgery performed?\"       6/27/2019  5. ANESTHESIA: \" What type of anesthesia did you have?\" (e.g., general, spinal, epidural, local)      General   6. PAIN: \"Is there any pain?\" If so, ask: \"How bad is it?\"  (Scale 1-10; or mild, moderate, severe)      Yes; mild to moderate   7. FEVER: \"Do you have a fever?\" If so, ask: \"What is your temperature, how was it measured, and when did it start?\"      No   8. VOMITING: \"Is there any vomiting?\" If yes, ask: \"How many times?\"      Once   9. BLEEDING: \"Is there any bleeding?\" If so, ask: \"How much?\" and \"Where?\"      No   10. OTHER SYMPTOMS: \"Do you have any other symptoms?\" (e.g., drainage from wound, painful urination, constipation)        No    Protocols used: POST-OP SYMPTOMS AND QUESTIONS-ADULT-AH      "

## 2019-07-01 LAB
CYTO UR: NORMAL
LAB AP CASE REPORT: NORMAL
PATH REPORT.FINAL DX SPEC: NORMAL
PATH REPORT.GROSS SPEC: NORMAL

## 2019-07-18 ENCOUNTER — LAB (OUTPATIENT)
Dept: LAB | Facility: HOSPITAL | Age: 34
End: 2019-07-18

## 2019-07-18 DIAGNOSIS — E05.90 HYPERTHYROIDISM: ICD-10-CM

## 2019-07-18 LAB — TSH SERPL DL<=0.05 MIU/L-ACNC: 0.54 MIU/ML (ref 0.47–4.68)

## 2019-07-18 PROCEDURE — 84443 ASSAY THYROID STIM HORMONE: CPT | Performed by: OTOLARYNGOLOGY

## 2019-07-18 PROCEDURE — 36415 COLL VENOUS BLD VENIPUNCTURE: CPT

## 2019-07-23 ENCOUNTER — OFFICE VISIT (OUTPATIENT)
Dept: OTOLARYNGOLOGY | Facility: CLINIC | Age: 34
End: 2019-07-23

## 2019-07-23 VITALS
SYSTOLIC BLOOD PRESSURE: 128 MMHG | TEMPERATURE: 98 F | BODY MASS INDEX: 38.89 KG/M2 | HEART RATE: 88 BPM | WEIGHT: 206 LBS | DIASTOLIC BLOOD PRESSURE: 68 MMHG | HEIGHT: 61 IN

## 2019-07-23 DIAGNOSIS — E89.0 S/P PARTIAL THYROIDECTOMY: ICD-10-CM

## 2019-07-23 DIAGNOSIS — E06.3 THYROIDITIS, LYMPHOCYTIC: ICD-10-CM

## 2019-07-23 DIAGNOSIS — E05.90 HYPERTHYROIDISM: Primary | ICD-10-CM

## 2019-07-23 DIAGNOSIS — E04.1 THYROID NODULE: ICD-10-CM

## 2019-07-23 PROCEDURE — 99024 POSTOP FOLLOW-UP VISIT: CPT | Performed by: NURSE PRACTITIONER

## 2019-07-23 NOTE — PROGRESS NOTES
Leena Marie, APRN   Chief complaint : Follow up thyroid problems     History of Present Illness  Beverley Silverio is a 33 y.o. female who is s/p right thyroidectomy on June 27, 2019. The patient has had a relatively normal postoperative course and currently has no related complaints. Her swallowing and fatigue has improved. She denies voice change or hoarseness.    Review of Systems   HENT: as per HPI  CONSTITUTIONAL: No fever, chills  GI: no nausea or vomiting    Past History:  Past medical and surgical history, family history and social history reviewed and updated when appropriate.  Current medications and allergies reviewed and updated when appropriate.  Allergies:  Sulfa antibiotics        Vital Signs  Temp:  [98 °F (36.7 °C)] 98 °F (36.7 °C)  Heart Rate:  [88] 88  BP: (128)/(68) 128/68    Physical Exam  CONSTITUTIONAL: well nourished, well-developed, alert, oriented, in no acute distress   COMMUNICATION AND VOICE: able to communicate normally, normal voice quality  HEAD: normocephalic, no lesions, atraumatic, no tenderness, no masses   FACE: appearance normal, no lesions, no tenderness, no deformities, facial motion symmetric  EYES: ocular motility normal, eyelids normal, orbits normal, no proptosis, conjunctiva normal , pupils equal, round   EARS:  Hearing: hearing to conversational voice intact bilaterally   External Ears: normal bilaterally, no lesions  NOSE:  External Nose: external nasal structure normal, no tenderness on palpation, no nasal discharge, no lesions, no evidence of trauma, nostrils patent   ORAL:  Lips: upper and lower lips without lesion   NECK:  Inspection and Palpation: neck appearance normal, no masses or tenderness  THYROID: s/p thyroidectomy with non prominent scar  CHEST/RESPIRATORY: normal respiratory effort   CARDIOVASCULAR: no cyanosis or edema   NEUROLOGICAL/PSYCHIATRIC: oriented to time, place and person, mood normal, affect appropriate, CN II-XII intact grossly    Results  Review:   Lab Results   Component Value Date    TSH 0.541 07/18/2019     Lab Results   Component Value Date    CALCIUM 9.4 06/20/2019     Lab Results   Component Value Date    FINALDX  06/27/2019     Thyroid, right, hemithyroidectomy:  Lymphocytic thyroiditis  Benign reactive nodule  Benign cysts  Negative for malignancy           Assessment   1. Hyperthyroidism    2. S/P right thyroidectomy    3. Thyroiditis, lymphocytic    4. Thyroid nodule        Plan      Repeat TSH in 3 months.   Schedule follow up thyroid ultrasound in 1 year.  Call for sooner evaluation if increasing size of the thyroid or nodules, increasing dysphagia, neck tightness or other thyroid problems.      Protect the incisions from sunlight. Sunlight to the incisions will cause permanent pigmentation to the incision line and make the incision more noticeable. After the incision has reepithelialized (typically 2-3 weeks after the procedure), you may begin to use sunscreen with an SPF of 15 or greater.  I discussed the use of a silicone-based wound cream to the incisions to optimize the end result. Apply topically twice daily, or as directed, to help optimize wound healing and decrease redness.      Orders Placed This Encounter   Procedures   • US Thyroid   • TSH   • TSH     Follow-up in 3-6 months.      Leena Marie, APRN  07/23/19  5:36 PM

## 2019-08-10 ENCOUNTER — HOSPITAL ENCOUNTER (EMERGENCY)
Age: 34
Discharge: HOME | End: 2019-08-10
Payer: COMMERCIAL

## 2019-08-10 ENCOUNTER — APPOINTMENT (OUTPATIENT)
Dept: CT IMAGING | Age: 34
End: 2019-08-10
Payer: COMMERCIAL

## 2019-08-10 VITALS
TEMPERATURE: 98 F | WEIGHT: 200 LBS | HEART RATE: 78 BPM | RESPIRATION RATE: 20 BRPM | OXYGEN SATURATION: 98 % | BODY MASS INDEX: 37.76 KG/M2 | HEIGHT: 61 IN | DIASTOLIC BLOOD PRESSURE: 76 MMHG | SYSTOLIC BLOOD PRESSURE: 132 MMHG

## 2019-08-10 DIAGNOSIS — S39.012A STRAIN OF LUMBAR REGION, INITIAL ENCOUNTER: Primary | ICD-10-CM

## 2019-08-10 DIAGNOSIS — R93.7 ABNORMAL CT OF SPINE: ICD-10-CM

## 2019-08-10 PROCEDURE — 6360000002 HC RX W HCPCS: Performed by: NURSE PRACTITIONER

## 2019-08-10 PROCEDURE — 72131 CT LUMBAR SPINE W/O DYE: CPT

## 2019-08-10 PROCEDURE — 6370000000 HC RX 637 (ALT 250 FOR IP): Performed by: NURSE PRACTITIONER

## 2019-08-10 PROCEDURE — 99284 EMERGENCY DEPT VISIT MOD MDM: CPT | Performed by: NURSE PRACTITIONER

## 2019-08-10 PROCEDURE — 99283 EMERGENCY DEPT VISIT LOW MDM: CPT

## 2019-08-10 PROCEDURE — 96372 THER/PROPH/DIAG INJ SC/IM: CPT

## 2019-08-10 RX ORDER — HYDROCODONE BITARTRATE AND ACETAMINOPHEN 7.5; 325 MG/1; MG/1
1 TABLET ORAL EVERY 6 HOURS PRN
Qty: 12 TABLET | Refills: 0 | Status: SHIPPED | OUTPATIENT
Start: 2019-08-10 | End: 2019-08-13

## 2019-08-10 RX ORDER — ORPHENADRINE CITRATE 30 MG/ML
60 INJECTION INTRAMUSCULAR; INTRAVENOUS ONCE
Status: COMPLETED | OUTPATIENT
Start: 2019-08-10 | End: 2019-08-10

## 2019-08-10 RX ORDER — DEXAMETHASONE SODIUM PHOSPHATE 10 MG/ML
10 INJECTION, SOLUTION INTRAMUSCULAR; INTRAVENOUS ONCE
Status: COMPLETED | OUTPATIENT
Start: 2019-08-10 | End: 2019-08-10

## 2019-08-10 RX ORDER — IBUPROFEN 800 MG/1
800 TABLET ORAL
Qty: 90 TABLET | Refills: 0 | Status: SHIPPED | OUTPATIENT
Start: 2019-08-10

## 2019-08-10 RX ORDER — ONDANSETRON 4 MG/1
4 TABLET, ORALLY DISINTEGRATING ORAL ONCE
Status: COMPLETED | OUTPATIENT
Start: 2019-08-10 | End: 2019-08-10

## 2019-08-10 RX ORDER — TIZANIDINE 4 MG/1
4 TABLET ORAL EVERY 8 HOURS PRN
Qty: 30 TABLET | Refills: 0 | Status: SHIPPED | OUTPATIENT
Start: 2019-08-10

## 2019-08-10 RX ORDER — METHYLPREDNISOLONE 4 MG/1
TABLET ORAL
Qty: 1 KIT | Refills: 0 | Status: SHIPPED | OUTPATIENT
Start: 2019-08-10 | End: 2019-08-16

## 2019-08-10 RX ORDER — KETOROLAC TROMETHAMINE 30 MG/ML
60 INJECTION, SOLUTION INTRAMUSCULAR; INTRAVENOUS ONCE
Status: COMPLETED | OUTPATIENT
Start: 2019-08-10 | End: 2019-08-10

## 2019-08-10 RX ADMIN — DEXAMETHASONE SODIUM PHOSPHATE 10 MG: 10 INJECTION, SOLUTION INTRAMUSCULAR; INTRAVENOUS at 20:12

## 2019-08-10 RX ADMIN — ORPHENADRINE CITRATE 60 MG: 30 INJECTION INTRAMUSCULAR; INTRAVENOUS at 20:11

## 2019-08-10 RX ADMIN — KETOROLAC TROMETHAMINE 60 MG: 30 INJECTION, SOLUTION INTRAMUSCULAR; INTRAVENOUS at 20:09

## 2019-08-10 RX ADMIN — ONDANSETRON 4 MG: 4 TABLET, ORALLY DISINTEGRATING ORAL at 21:06

## 2019-08-10 RX ADMIN — HYDROMORPHONE HYDROCHLORIDE 1 MG: 1 INJECTION, SOLUTION INTRAMUSCULAR; INTRAVENOUS; SUBCUTANEOUS at 21:04

## 2019-08-10 ASSESSMENT — ENCOUNTER SYMPTOMS
CONSTIPATION: 0
CHEST TIGHTNESS: 0
EYE DISCHARGE: 0
WHEEZING: 0
NAUSEA: 0
TROUBLE SWALLOWING: 0
ABDOMINAL DISTENTION: 0
COLOR CHANGE: 0
EYE ITCHING: 0
DIARRHEA: 0
VOMITING: 0
BACK PAIN: 1
SHORTNESS OF BREATH: 0
SINUS PAIN: 0
EYE REDNESS: 0
ABDOMINAL PAIN: 0
STRIDOR: 0
SORE THROAT: 0
ALLERGIC/IMMUNOLOGIC NEGATIVE: 1
EYE PAIN: 0
PHOTOPHOBIA: 0
BLOOD IN STOOL: 0

## 2019-08-10 ASSESSMENT — PAIN SCALES - GENERAL
PAINLEVEL_OUTOF10: 8
PAINLEVEL_OUTOF10: 6
PAINLEVEL_OUTOF10: 7

## 2019-08-11 NOTE — ED PROVIDER NOTES
bowel incontinence. Patient complains of only localized low back pain. Discussed with patient that if she should develop any of these worsening symptoms as discussed she needs to return immediately to the emergency department. I provided patient with a referral to Dr. Lavelle Recinos, neurosurgeon. Patient was medicated with dexamethasone, Toradol, and Norflex initially and said pain decreased from a 6 to a 4. Before discharge patient was medicated with Dilaudid 1 mg and had improvement in pain. Patient was ambulatory in the department and out of the department at discharge. Patient is well-appearing, stable for discharge and follow-up without fail with his/her medical doctor. I had a detailed discussion with the patient and/or guardian regarding the historical points, exam findings, and any diagnostic results supporting the discharge diagnosis. The patient was educated on care and need for follow-up. Strict return instructions including red flag signs and symptoms were discussed with the patient. Medications for discharge discussed, and adverse effects reviewed. Questions invited and answered. Patient shows understanding of the discharge information and agrees to follow-up. PROCEDURES:    Procedures      FINAL IMPRESSION      1. Strain of lumbar region, initial encounter    2. Abnormal CT of spine          DISPOSITION/PLAN   DISPOSITION        PATIENT REFERRED TO:  [facility] EMERGENCY DEPT  [facility]  [phone]    As needed, If symptoms worsen    DO Bridgett Boland [facility] [address]  [phone]    Schedule an appointment as soon as possible for a visit         DISCHARGE MEDICATIONS:  Discharge Medication List as of 8/10/2019  9:02 PM      START taking these medications    Details   HYDROcodone-acetaminophen (NORCO) 7.5-325 MG per tablet Take 1 tablet by mouth every 6 hours as needed for Pain for up to 3 days. Intended supply: 3 days.  Take lowest dose

## 2019-08-12 ENCOUNTER — TELEPHONE (OUTPATIENT)
Dept: NEUROSURGERY | Age: 34
End: 2019-08-12

## 2019-08-14 ENCOUNTER — OFFICE VISIT (OUTPATIENT)
Dept: NEUROSURGERY | Age: 34
End: 2019-08-14
Payer: COMMERCIAL

## 2019-08-14 ENCOUNTER — HOSPITAL ENCOUNTER (OUTPATIENT)
Dept: GENERAL RADIOLOGY | Age: 34
Discharge: HOME | End: 2019-08-14
Payer: COMMERCIAL

## 2019-08-14 VITALS
SYSTOLIC BLOOD PRESSURE: 115 MMHG | BODY MASS INDEX: 37.38 KG/M2 | DIASTOLIC BLOOD PRESSURE: 74 MMHG | OXYGEN SATURATION: 98 % | WEIGHT: 198 LBS | HEART RATE: 70 BPM | HEIGHT: 61 IN

## 2019-08-14 DIAGNOSIS — M51.26 LUMBAR DISC HERNIATION: ICD-10-CM

## 2019-08-14 DIAGNOSIS — M43.17 SPONDYLOLISTHESIS AT L5-S1 LEVEL: ICD-10-CM

## 2019-08-14 DIAGNOSIS — M51.36 DDD (DEGENERATIVE DISC DISEASE), LUMBAR: ICD-10-CM

## 2019-08-14 DIAGNOSIS — M43.17 SPONDYLOLISTHESIS AT L5-S1 LEVEL: Primary | ICD-10-CM

## 2019-08-14 DIAGNOSIS — M43.06 PARS DEFECT OF LUMBAR SPINE: ICD-10-CM

## 2019-08-14 DIAGNOSIS — G89.29 CHRONIC MIDLINE LOW BACK PAIN WITHOUT SCIATICA: ICD-10-CM

## 2019-08-14 DIAGNOSIS — M54.50 CHRONIC MIDLINE LOW BACK PAIN WITHOUT SCIATICA: ICD-10-CM

## 2019-08-14 DIAGNOSIS — M79.18 RIGHT BUTTOCK PAIN: ICD-10-CM

## 2019-08-14 PROCEDURE — 99204 OFFICE O/P NEW MOD 45 MIN: CPT | Performed by: NURSE PRACTITIONER

## 2019-08-14 PROCEDURE — 72120 X-RAY BEND ONLY L-S SPINE: CPT

## 2019-08-14 RX ORDER — HYDROCODONE BITARTRATE AND ACETAMINOPHEN 5; 325 MG/1; MG/1
TABLET ORAL
Refills: 0 | COMMUNITY
Start: 2019-06-27

## 2019-08-14 ASSESSMENT — ENCOUNTER SYMPTOMS
RESPIRATORY NEGATIVE: 1
DIARRHEA: 1
BACK PAIN: 1
EYES NEGATIVE: 1

## 2019-08-14 NOTE — PROGRESS NOTES
[facility] Neurosurgery  Office Visit      Chief Complaint   Patient presents with    Back Pain     Bilateral low back pain, pain is primarily located midline and towards right side       HISTORY OF PRESENT ILLNESS:    Triston Garcia is a 26 y.o. female  referred to us from the ED after experiencing severe low back pain that started about 6 days ago. During her work up in the ED, CT lumbar revealed L5-S1 spondylolisthesis with L5 bilateral pars defects. She has had some mild back pain in the past, however, nothing to this degree. She does not have radiating pains, however, will have some right sided buttock pain. She states that sitting makes the back pain worse. Her pain is improved when going from a seated to standing position. Her pain is improved with walking. Her pain is worsened when lying flat. Overall, indicative that the patient does have a mechanical nature to their pain. She states that 95% of her pain is located in the back and 5% is right buttock pain. The patient has underwent a non-operative treatment course that has included:  NSAIDs (Advil)  Muscle Relaxers (flexeril)  Opiates (Norco)  Oral Steroids (medrol dose pack)     Of note she does not use tobacco and does not take blood thinning medications. History reviewed. No pertinent past medical history. Past Surgical History:   Procedure Laterality Date    THYROIDECTOMY, PARTIAL  06/24/2019       Current Outpatient Medications   Medication Sig Dispense Refill    HYDROcodone-acetaminophen (NORCO) 5-325 MG per tablet TK 1-2 TS PO Q 4 H PRN P  0    ibuprofen (ADVIL;MOTRIN) 800 MG tablet Take 1 tablet by mouth 3 times daily (with meals) 90 tablet 0    methylPREDNISolone (MEDROL, ANA,) 4 MG tablet Take by mouth. 1 kit 0    tiZANidine (ZANAFLEX) 4 MG tablet Take 1 tablet by mouth every 8 hours as needed (back pain/spasm) 30 tablet 0     No current facility-administered medications for this visit.         Allergies: lumbar spine M43.06 XR LUMBAR SPINE FLEXION AND EXTENSION ONLY     MRI Lumbar Spine WO Contrast   3. DDD (degenerative disc disease), lumbar M51.36 MRI Lumbar Spine WO Contrast   4. Lumbar disc herniation M51.26 MRI Lumbar Spine WO Contrast   5. Chronic midline low back pain without sciatica M54.5 MRI Lumbar Spine WO Contrast    G89.29    6. Right buttock pain M79.18 MRI Lumbar Spine WO Contrast       PLAN:  I have discussed and reviewed the results of the CT lumbar spine with Ms. Carmela Mortimer at length. I explained that she does have a spondylolisthesis at L5-S1 with bilateral pars defect. I explained that we need additional imaging to make the best treatment plan for her. She verbalizes understanding. I do not wish to start her in PT until instability is ruled out.    -Obtain XR lumbar flex/ex to rule out instability   -Obtain MRI lumbar spine  -Follow up after imaging       Note: A total of >50% (23 minutes) of 45 minutes was spent discussing the pathophysiology and treatment and/or coordination of care of the above diagnoses.       LAURO Girard

## 2019-08-20 ENCOUNTER — HOSPITAL ENCOUNTER (OUTPATIENT)
Dept: MRI IMAGING | Age: 34
Discharge: HOME | End: 2019-08-20
Payer: COMMERCIAL

## 2019-08-20 DIAGNOSIS — M51.36 DDD (DEGENERATIVE DISC DISEASE), LUMBAR: ICD-10-CM

## 2019-08-20 DIAGNOSIS — M51.26 LUMBAR DISC HERNIATION: ICD-10-CM

## 2019-08-20 DIAGNOSIS — G89.29 CHRONIC MIDLINE LOW BACK PAIN WITHOUT SCIATICA: ICD-10-CM

## 2019-08-20 DIAGNOSIS — M54.50 CHRONIC MIDLINE LOW BACK PAIN WITHOUT SCIATICA: ICD-10-CM

## 2019-08-20 DIAGNOSIS — M43.06 PARS DEFECT OF LUMBAR SPINE: ICD-10-CM

## 2019-08-20 DIAGNOSIS — M79.18 RIGHT BUTTOCK PAIN: ICD-10-CM

## 2019-08-20 DIAGNOSIS — M43.17 SPONDYLOLISTHESIS AT L5-S1 LEVEL: ICD-10-CM

## 2019-08-20 PROCEDURE — 72148 MRI LUMBAR SPINE W/O DYE: CPT

## 2019-08-30 ENCOUNTER — HOSPITAL ENCOUNTER (OUTPATIENT)
Dept: CT IMAGING | Age: 34
Discharge: HOME | End: 2019-08-30
Payer: COMMERCIAL

## 2019-08-30 ENCOUNTER — HOSPITAL ENCOUNTER (OUTPATIENT)
Dept: NEUROLOGY | Age: 34
Discharge: HOME | End: 2019-08-30
Payer: COMMERCIAL

## 2019-08-30 DIAGNOSIS — R41.82 ALTERED MENTAL STATUS, UNSPECIFIED ALTERED MENTAL STATUS TYPE: ICD-10-CM

## 2019-08-30 DIAGNOSIS — R20.2 PARESTHESIA: ICD-10-CM

## 2019-08-30 PROCEDURE — 95816 EEG AWAKE AND DROWSY: CPT

## 2019-08-30 PROCEDURE — 95819 EEG AWAKE AND ASLEEP: CPT | Performed by: PSYCHIATRY & NEUROLOGY

## 2019-08-30 PROCEDURE — 70450 CT HEAD/BRAIN W/O DYE: CPT

## 2019-09-03 LAB
ALBUMIN SERPL-MCNC: 3.6 G/DL (ref 3.5–5.2)
ALP BLD-CCNC: 106 U/L (ref 35–104)
ALT SERPL-CCNC: 27 U/L (ref 5–33)
ANION GAP SERPL CALCULATED.3IONS-SCNC: 11 MMOL/L (ref 7–19)
AST SERPL-CCNC: 16 U/L (ref 5–32)
BASOPHILS ABSOLUTE: 0 K/UL (ref 0–0.2)
BASOPHILS RELATIVE PERCENT: 0.4 % (ref 0–1)
BILIRUB SERPL-MCNC: 0.5 MG/DL (ref 0.2–1.2)
BUN BLDV-MCNC: 7 MG/DL (ref 6–20)
CALCIUM SERPL-MCNC: 9.6 MG/DL (ref 8.6–10)
CHLORIDE BLD-SCNC: 107 MMOL/L (ref 98–111)
CO2: 22 MMOL/L (ref 22–29)
CREAT SERPL-MCNC: 0.7 MG/DL (ref 0.5–0.9)
EOSINOPHILS ABSOLUTE: 0.1 K/UL (ref 0–0.6)
EOSINOPHILS RELATIVE PERCENT: 0.6 % (ref 0–5)
GFR NON-AFRICAN AMERICAN: >60
GLUCOSE BLD-MCNC: 99 MG/DL (ref 74–109)
GLUCOSE FASTING: 100 MG/DL (ref 75–110)
GLUCOSE TOLERANCE TEST 1 HOUR: 172 MG/DL (ref 75–190)
GLUCOSE TOLERANCE TEST 2 HOUR: 127 MG/DL (ref 75–140)
GLUCOSE, 3 HOUR: 102 MG/DL (ref 75–145)
HCT VFR BLD CALC: 45.4 % (ref 37–47)
HEMOGLOBIN: 14.5 G/DL (ref 12–16)
IMMATURE GRANULOCYTES #: 0 K/UL
LYMPHOCYTES ABSOLUTE: 2.2 K/UL (ref 1.1–4.5)
LYMPHOCYTES RELATIVE PERCENT: 23.9 % (ref 20–40)
MCH RBC QN AUTO: 29.7 PG (ref 27–31)
MCHC RBC AUTO-ENTMCNC: 31.9 G/DL (ref 33–37)
MCV RBC AUTO: 93 FL (ref 81–99)
MONOCYTES ABSOLUTE: 0.5 K/UL (ref 0–0.9)
MONOCYTES RELATIVE PERCENT: 5.8 % (ref 0–10)
NEUTROPHILS ABSOLUTE: 6.2 K/UL (ref 1.5–7.5)
NEUTROPHILS RELATIVE PERCENT: 69.1 % (ref 50–65)
PDW BLD-RTO: 14 % (ref 11.5–14.5)
PLATELET # BLD: 299 K/UL (ref 130–400)
PMV BLD AUTO: 10.5 FL (ref 9.4–12.3)
POTASSIUM SERPL-SCNC: 4.1 MMOL/L (ref 3.5–5)
RBC # BLD: 4.88 M/UL (ref 4.2–5.4)
SODIUM BLD-SCNC: 140 MMOL/L (ref 136–145)
T4 TOTAL: 7.9 UG/DL (ref 4.5–11.7)
TOTAL PROTEIN: 5.5 G/DL (ref 6.6–8.7)
TSH SERPL DL<=0.05 MIU/L-ACNC: 1.03 UIU/ML (ref 0.27–4.2)
VITAMIN B-12: 317 PG/ML (ref 211–946)
WBC # BLD: 9 K/UL (ref 4.8–10.8)

## 2019-09-03 NOTE — PROCEDURES
ADULT OUTPATIENT ELECTROENCEPHALOGRAM REPORT    Patient:   Gustavo Montez  MR#:    [id number]  YOB: 1985  Date of Evaluation:  8/30/2019  Primary Physician:     Sameera Schultz MD   Referring Physician:   LUARO Ellis    CLINICAL INFORMATION:     This patient is a 26 y.o. female with a history of convulsions. MEDICATIONS:     See MAR. RECORDING CONDITIONS:     This EEG was performed utilizing standard International 10-20 System of electrode placement, with additional channels monitored for eye movement. One channel electrocardiogram was monitored. Data was obtained, stored, and interpreted according to ACNS guidelines (J Clin Neurophysiol 2006;23(2):) utilizing referential montage recording, with reformatting to longitudinal, transverse bipolar, and referential montages as necessary for interpretation, along with the digital/automated EEG analysis. Patient tolerated entire procedure well. Photic stimulation and hyperventilation were utilized as activation procedures unless otherwise specified below. E.E.G. DESCRIPTION:     The resting predominant posterior background frequency is a 9-10 Hz 30-40 uV rhythm. No overt focal, lateralizing, or paroxysmal abnormalities were noted through the recording. Drowsiness was demonstrated by a loss of the background waking activities. Onset of stage I sleep was demonstrated by gradual disappearance of background waking rhythms with gradual symmetric mixed frequency 4-7 Hz slowing. Stage II sleep was characterized by vertex transients, sleep-spindles, and K-complexes, at times with shifting asymmetry demonstrated. Hyperventilation was not performed. Photic stimulation was preformed at frequencies between 1 and 30 Hz demonstrating symmetric bioccipital driving responses over a range of frequencies. No ECG abnormalities were noted. Muscle, motion, and eye movement artifacts were noted.        EEG INTERPRETATION:    Normal EEG for age in the awake,

## 2019-09-10 ENCOUNTER — OFFICE VISIT (OUTPATIENT)
Dept: NEUROSURGERY | Age: 34
End: 2019-09-10
Payer: COMMERCIAL

## 2019-09-10 VITALS
OXYGEN SATURATION: 98 % | BODY MASS INDEX: 36.82 KG/M2 | HEART RATE: 81 BPM | DIASTOLIC BLOOD PRESSURE: 80 MMHG | WEIGHT: 195 LBS | SYSTOLIC BLOOD PRESSURE: 115 MMHG | HEIGHT: 61 IN

## 2019-09-10 DIAGNOSIS — G89.29 CHRONIC MIDLINE LOW BACK PAIN WITHOUT SCIATICA: ICD-10-CM

## 2019-09-10 DIAGNOSIS — M54.50 CHRONIC MIDLINE LOW BACK PAIN WITHOUT SCIATICA: ICD-10-CM

## 2019-09-10 DIAGNOSIS — M43.17 SPONDYLOLISTHESIS AT L5-S1 LEVEL: Primary | ICD-10-CM

## 2019-09-10 DIAGNOSIS — M51.36 DDD (DEGENERATIVE DISC DISEASE), LUMBAR: ICD-10-CM

## 2019-09-10 DIAGNOSIS — M43.06 PARS DEFECT OF LUMBAR SPINE: ICD-10-CM

## 2019-09-10 DIAGNOSIS — M51.26 LUMBAR DISC HERNIATION: ICD-10-CM

## 2019-09-10 PROCEDURE — 99214 OFFICE O/P EST MOD 30 MIN: CPT | Performed by: NURSE PRACTITIONER

## 2019-09-27 ENCOUNTER — TRANSCRIBE ORDERS (OUTPATIENT)
Dept: PHYSICAL THERAPY | Facility: CLINIC | Age: 34
End: 2019-09-27

## 2019-09-27 DIAGNOSIS — M43.17 ACQUIRED SPONDYLOLISTHESIS OF LUMBOSACRAL REGION: Primary | ICD-10-CM

## 2019-09-27 DIAGNOSIS — M43.06 PARS DEFECT OF LUMBAR SPINE: ICD-10-CM

## 2019-09-27 DIAGNOSIS — M54.50 CHRONIC MIDLINE LOW BACK PAIN WITHOUT SCIATICA: ICD-10-CM

## 2019-09-27 DIAGNOSIS — M51.36 DDD (DEGENERATIVE DISC DISEASE), LUMBAR: ICD-10-CM

## 2019-09-27 DIAGNOSIS — G89.29 CHRONIC MIDLINE LOW BACK PAIN WITHOUT SCIATICA: ICD-10-CM

## 2019-10-03 ENCOUNTER — TREATMENT (OUTPATIENT)
Dept: PHYSICAL THERAPY | Facility: CLINIC | Age: 34
End: 2019-10-03

## 2019-10-03 DIAGNOSIS — M51.26 LUMBAR DISC HERNIATION: ICD-10-CM

## 2019-10-03 DIAGNOSIS — M51.36 DDD (DEGENERATIVE DISC DISEASE), LUMBAR: ICD-10-CM

## 2019-10-03 DIAGNOSIS — M43.06 PARS DEFECT OF LUMBAR SPINE: ICD-10-CM

## 2019-10-03 DIAGNOSIS — M54.50 CHRONIC MIDLINE LOW BACK PAIN WITHOUT SCIATICA: ICD-10-CM

## 2019-10-03 DIAGNOSIS — G89.29 CHRONIC MIDLINE LOW BACK PAIN WITHOUT SCIATICA: ICD-10-CM

## 2019-10-03 DIAGNOSIS — M43.17 SPONDYLOLISTHESIS AT L5-S1 LEVEL: Primary | ICD-10-CM

## 2019-10-03 PROCEDURE — 97140 MANUAL THERAPY 1/> REGIONS: CPT | Performed by: PHYSICAL THERAPIST

## 2019-10-03 PROCEDURE — 97110 THERAPEUTIC EXERCISES: CPT | Performed by: PHYSICAL THERAPIST

## 2019-10-03 PROCEDURE — 97161 PT EVAL LOW COMPLEX 20 MIN: CPT | Performed by: PHYSICAL THERAPIST

## 2019-10-03 NOTE — PROGRESS NOTES
Outpatient Physical Therapy Ortho Initial Evaluation       Patient Name: Beverley Silverio  : 1985  MRN: [id number]  Today's Date: 10/3/2019      Visit Date: 10/03/2019    Patient Active Problem List   Diagnosis   • Left lower quadrant pain   • Nonsmoker   • Obesity, unspecified obesity severity, unspecified obesity type   • Hyperthyroidism   • Weight gain   • Fatigue   • S/P right thyroidectomy        Past Medical History:   Diagnosis Date   • Abnormal Pap smear of cervix    • Anxiety    • Family history of thyroid cancer    • Menorrhagia with irregular cycle    • Migraine    • S/P right thyroidectomy 2019   • Thyroid nodule         Past Surgical History:   Procedure Laterality Date   •  SECTION     • D&C HYSTEROSCOPY ENDOMETRIAL ABLATION N/A 2017    Procedure: DILATATION AND CURETTAGE HYSTEROSCOPY NOVASURE ENDOMETRIAL ABLATION;  Surgeon: Jj Danielson MD;  Location:  [facility] OR;  Service:    • THYROIDECTOMY Right 2019    Procedure: RIGHT THYROIDECTOMY with laryngeal nerve monitor;  Surgeon: Cyrus Gil MD;  Location:  [facility] OR;  Service: ENT   • TUBAL ABDOMINAL LIGATION         Visit Dx:     ICD-10-CM ICD-9-CM   1. Spondylolisthesis at L5-S1 level M43.17 756.12   2. Pars defect of lumbar spine M43.06 738.4   3. Lumbar disc herniation M51.26 722.10   4. DDD (degenerative disc disease), lumbar M51.36 722.52   5. Chronic midline low back pain without sciatica M54.5 724.2    G89.29 338.29         Patient History     Row Name 10/03/19 0700             History    Chief Complaint  Pain  -KR      Type of Pain  Back pain R LB  -KR      Date Current Problem(s) Began  -- worsening one month ago  -KR      Brief Description of Current Complaint  She reports chronic back pain, but one month ago she had to go to the ER. She reports with bending she gets pain and catching. She did have a  in . No numbness/tingling. Denies bowel/bladder issues  -KR      Patient/Caregiver Goals   Relieve pain;Return to prior level of function;Know what to do to help the symptoms  -KR      Occupation/sports/leisure activities    -KR      How has patient tried to help current problem?  muscle relaxer, steriod, pain medication  -KR      What clinical tests have you had for this problem?  X-ray;CT scan;MRI  -KR      Results of Clinical Tests  L5-S1 pars defect with spondy grade 1; L4-5 central disc protrusion L4-5 with moderate spinal core stenosis, mild-moderate foraminal stenosis (B) L4-5  -KR         Pain     Pain Location  Back  -KR      Pain at Present  2;3  -KR      Pain at Best  2;3  -KR      Pain at Worst  10  -KR      Pain Frequency  Constant/continuous  -KR      Pain Description  Stabbing  -KR      What Performance Factors Make the Current Problem(s) WORSE?  bending  -KR      What Performance Factors Make the Current Problem(s) BETTER?  changing positions  -KR      Is your sleep disturbed?  No  -KR         Fall Risk Assessment    Any falls in the past year:  No  -KR         Services    Prior Rehab/Home Health Experiences  No  -KR      Are you currently receiving Home Health services  No  -KR      Do you plan to receive Home Health services in the near future  No  -KR         Daily Activities    Pt Participated in POC and Goals  Yes  -KR        User Key  (r) = Recorded By, (t) = Taken By, (c) = Cosigned By    Initials Name Provider Type    Lina Taylor PT DPT Physical Therapist          PT Ortho     Row Name 10/03/19 0900       Subjective Comments    Subjective Comments     -KR    Row Name 10/03/19 0700       Posture/Observations    Alignment Options  Cervical lordosis;Forward head;Thoracic kyphosis;Rounded shoulders;Lumbar lordosis  -KR    Forward Head  Mild;Increased  -KR    Cervical Lordosis  -- hinges lower C spine  -KR    Thoracic Kyphosis  Mild;Increased  -KR    Rounded Shoulders  Mild;Increased  -KR    Lumbar lordosis  Mild;Moderate;Increased tends to hinge  mid-uppper lumbar  -KR    Posture/Observations Comments  stands in anterior tilted pelvis  -KR       Quarter Clearing    Quarter Clearing  Lower Quarter Clearing  -KR       DTR- Lower Quarter Clearing    Patellar tendon (L2-4)  Bilateral:;2- Normal response  -KR    Achilles tendon (S1-2)  Bilateral:;2- Normal response  -KR       Pathological Reflexes- Lower Quarter Clearing    Clonus  Bilateral:;Negative  -KR       Sensory Screen for Light Touch- Lower Quarter Clearing    L1 (inguinal area)  Bilateral:;Intact  -KR    L2 (anterior mid thigh)  Bilateral:;Intact  -KR    L3 (distal anterior thigh)  Bilateral:;Intact  -KR    L4 (medial lower leg/foot)  Bilateral:;Intact  -KR    L5 (lateral lower leg/great toe)  Bilateral:;Intact  -KR    S1 (bottom of foot)  Bilateral:;Intact  -KR       Myotomal Screen- Lower Quarter Clearing    Hip flexion (L2)  Bilateral:;4+ (Good +)  -KR    Knee extension (L3)  Bilateral:;5 (Normal)  -KR    Ankle DF (L4)  Bilateral:;5 (Normal)  -KR    Great toe extension (L5)  Bilateral:;5 (Normal)  -KR    Ankle PF (S1)  Bilateral:;5 (Normal)  -KR    Knee flexion (S2)  Bilateral:;5 (Normal)  -KR       Lumbar ROM Screen- Lower Quarter Clearing    Lumbar Flexion  -- 50%  -KR       SI/Hip Screen- Lower Quarter Clearing    Gerald's/Tod's test  Bilateral:;Negative  -KR    Posterior thigh sheer  Bilateral:;Negative  -KR       Special Tests/Palpation    Special Tests/Palpation  Cervical/Thoracic;Lumbar/SI  -KR       Thoracic Accessory Motions    Thoracic Accessory Motions Tested?  Yes  -KR    Pa glide- Middle thoracic  Hypomobile  -KR       Lumbar/SI Special Tests    SLR (Neural Tension)  Bilateral:;Negative  -KR    Thigh Thrust/Posterior Shear (SI Dysfunction)  Bilateral:;Negative  -KR    GERALD (hip vs. SI Dysfunction)  Bilateral:;Negative  -KR    FAIR Test (Piriformis Syndrome)  Bilateral:;Negative  -KR    Sacral Spring Test (SI Dysfunction)  Bilateral:;Negative  -KR       Lumbosacral Palpation     Lumbosacral Palpation?  Yes  -KR       General ROM    RT Lower Ext  Rt Hip Internal Rotation;Rt Hip External Rotation  -KR    LT Lower Ext  Lt Hip External Rotation;Lt Hip Internal Rotation  -KR       Right Lower Ext    Rt Hip External Rotation PROM  48  -KR    Rt Hip Internal Rotation PROM  18  -KR       Left Lower Ext    Lt Hip External Rotation PROM  55  -KR    Lt Hip Internal Rotation PROM  20  -KR       MMT (Manual Muscle Testing)    General MMT Comments  glut med strength: L 4/5 R 3+/5  -KR      User Key  (r) = Recorded By, (t) = Taken By, (c) = Cosigned By    Initials Name Provider Type    Lina Taylor, PT DPT Physical Therapist                      Therapy Education  Education Details: piriformis stretches seated and supine; TA  Given: HEP, Symptoms/condition management, Posture/body mechanics  Program: New  How Provided: Verbal, Demonstration, Written  Provided to: Patient  Level of Understanding: Verbalized, Demonstrated     PT OP Goals     Row Name 10/03/19 0700          PT Short Term Goals    STG Date to Achieve  11/02/19  -KR        Long Term Goals    LTG Date to Achieve  12/02/19  -KR     LTG 1  Pt will score 5% or less on the modified Oswestry index.   -KR     LTG 1 Progress  New  -KR     LTG 2  Pt will report pain no greater than 1-/2/10 with bending activities.   -KR     LTG 2 Progress  New  -KR     LTG 3  Pt will be independent with HEP to include hip and core strengthening.   -KR     LTG 3 Progress  New  -KR     LTG 4  Pt will demonstrate 4/5 or greater glut med strength on RLE.  -KR     LTG 4 Progress  New  -KR        Time Calculation    PT Goal Re-Cert Due Date  11/02/19  -KR       User Key  (r) = Recorded By, (t) = Taken By, (c) = Cosigned By    Initials Name Provider Type    Lian Taylor, PT DPT Physical Therapist          PT Assessment/Plan     Row Name 10/03/19 0700          PT Assessment    Functional Limitations  Limitation in home management;Limitations in community  activities;Performance in leisure activities  -KR     Impairments  Pain;Muscle strength;Joint mobility;Posture;Range of motion  -KR     Assessment Comments  Beverley presents today with chronic back pain that worsened last month. Upon assessment today she does have decreased spinal and hip mobiltiy. She has poor core and specifically glut med strength R>L. These factors are increasing strain on her lower back. She is very motivated and should progress well. Skilled PT needed to address spinal and hip deficits and progress with core strengthening towards d/c ro prevent future flares.   -KR     Please refer to paper survey for additional self-reported information  Yes  -KR     Rehab Potential  Good  -KR     Patient/caregiver participated in establishment of treatment plan and goals  Yes  -KR     Patient would benefit from skilled therapy intervention  Yes  -KR        PT Plan    Predicted Duration of Therapy Intervention (Therapy Eval)  8-10 visits  -KR     Planned CPT's?  PT EVAL LOW COMPLEXITY: 30200;PT RE-EVAL: 30354;PT THER PROC EA 15 MIN: 85620;PT THER ACT EA 15 MIN: 49862;PT MANUAL THERAPY EA 15 MIN: 51097;PT NEUROMUSC RE-EDUCATION EA 15 MIN: 45783;PT GAIT TRAINING EA 15 MIN: 77604;PT ELECTRICAL STIM UNATTEND: ;PT ELECTRICAL STIM ATTD EA 15 MIN: 35179;PT ULTRASOUND EA 15 MIN: 76811  -KR     PT Plan Comments  Start by decreasing soft tissue restrictions around RLB and glut/piriformis. Work on mid thoracic and hip mobiltiy. Progress with PPT, generalized core and hip strengthening, specifically glut med. Work on standing posture and gait to decrease strain on lower back.   -KR       User Key  (r) = Recorded By, (t) = Taken By, (c) = Cosigned By    Initials Name Provider Type    Lina Taylor, PT DPT Physical Therapist            OP Exercises     Row Name 10/03/19 0900 10/03/19 0700          Subjective Comments    Subjective Comments     -KR  --        Total Minutes    28866 - PT Therapeutic Exercise  Minutes  --  10  -KR     44385 - PT Manual Therapy Minutes  --  9  -KR        Exercise 1    Exercise Name 1  --  cross body piriformis stretch  -KR     Cueing 1  --  Verbal  -KR     Sets 1  --  1  -KR     Time 1  --  30 sec each  -KR        Exercise 2    Exercise Name 2  --  TA isolation with breathing  -KR     Cueing 2  --  Tactile;Verbal  -KR        Exercise 3    Exercise Name 3  --  seated piriformis stretches  -KR     Cueing 3  --  Verbal  -KR     Sets 3  --  1  -KR     Time 3  --  30 sec each  -KR       User Key  (r) = Recorded By, (t) = Taken By, (c) = Cosigned By    Initials Name Provider Type    Lina Taylor, PT DPT Physical Therapist           Manual Rx (last 36 hours)      Manual Treatments     Row Name 10/03/19 0700             Total Minutes    77056 - PT Manual Therapy Minutes  9  -KR         Manual Rx 1    Manual Rx 1 Location  prone STM to lumbar paraspinals and R gluts/piriformis  -KR        User Key  (r) = Recorded By, (t) = Taken By, (c) = Cosigned By    Initials Name Provider Type    Lina Taylor, PT DPT Physical Therapist                      Outcome Measure Options: Modifed Owestry  Modified Oswestry  Modified Oswestry Score/Comments: 12%      Time Calculation:     Total Timed Code Minutes- PT: 19 minute(s)         PT G-Domonique  Outcome Measure Options: Modifed Owestry  Modified Oswestry Score/Comments: 12%         Lina Gonzalez PT DPT  10/3/2019

## 2019-10-07 ENCOUNTER — HOSPITAL ENCOUNTER (OUTPATIENT)
Dept: ULTRASOUND IMAGING | Facility: HOSPITAL | Age: 34
Discharge: HOME | End: 2019-10-07
Admitting: NURSE PRACTITIONER

## 2019-10-07 ENCOUNTER — LAB (OUTPATIENT)
Dept: LAB | Facility: HOSPITAL | Age: 34
End: 2019-10-07

## 2019-10-07 DIAGNOSIS — E06.3 THYROIDITIS, LYMPHOCYTIC: ICD-10-CM

## 2019-10-07 DIAGNOSIS — E05.90 HYPERTHYROIDISM: ICD-10-CM

## 2019-10-07 DIAGNOSIS — E89.0 S/P PARTIAL THYROIDECTOMY: ICD-10-CM

## 2019-10-07 PROCEDURE — 36415 COLL VENOUS BLD VENIPUNCTURE: CPT

## 2019-10-07 PROCEDURE — 84443 ASSAY THYROID STIM HORMONE: CPT | Performed by: NURSE PRACTITIONER

## 2019-10-07 PROCEDURE — 76536 US EXAM OF HEAD AND NECK: CPT

## 2019-10-08 LAB — TSH SERPL DL<=0.05 MIU/L-ACNC: 0.94 UIU/ML (ref 0.27–4.2)

## 2019-10-09 DIAGNOSIS — E05.90 HYPERTHYROIDISM: Primary | ICD-10-CM

## 2019-10-09 DIAGNOSIS — E04.1 THYROID NODULE: ICD-10-CM

## 2019-10-10 ENCOUNTER — TREATMENT (OUTPATIENT)
Dept: PHYSICAL THERAPY | Facility: CLINIC | Age: 34
End: 2019-10-10

## 2019-10-10 ENCOUNTER — OFFICE VISIT (OUTPATIENT)
Dept: OBSTETRICS AND GYNECOLOGY | Facility: CLINIC | Age: 34
End: 2019-10-10

## 2019-10-10 VITALS — WEIGHT: 195 LBS | BODY MASS INDEX: 36.84 KG/M2 | SYSTOLIC BLOOD PRESSURE: 100 MMHG | DIASTOLIC BLOOD PRESSURE: 60 MMHG

## 2019-10-10 DIAGNOSIS — M43.06 PARS DEFECT OF LUMBAR SPINE: ICD-10-CM

## 2019-10-10 DIAGNOSIS — G89.29 CHRONIC MIDLINE LOW BACK PAIN WITHOUT SCIATICA: ICD-10-CM

## 2019-10-10 DIAGNOSIS — M43.17 SPONDYLOLISTHESIS AT L5-S1 LEVEL: Primary | ICD-10-CM

## 2019-10-10 DIAGNOSIS — M51.26 LUMBAR DISC HERNIATION: ICD-10-CM

## 2019-10-10 DIAGNOSIS — M54.50 CHRONIC MIDLINE LOW BACK PAIN WITHOUT SCIATICA: ICD-10-CM

## 2019-10-10 DIAGNOSIS — Z87.891 FORMER SMOKER: ICD-10-CM

## 2019-10-10 DIAGNOSIS — M51.36 DDD (DEGENERATIVE DISC DISEASE), LUMBAR: ICD-10-CM

## 2019-10-10 DIAGNOSIS — Z12.31 ENCOUNTER FOR SCREENING MAMMOGRAM FOR MALIGNANT NEOPLASM OF BREAST: ICD-10-CM

## 2019-10-10 DIAGNOSIS — Z01.419 ENCOUNTER FOR GYNECOLOGICAL EXAMINATION WITHOUT ABNORMAL FINDING: Primary | ICD-10-CM

## 2019-10-10 PROCEDURE — 87591 N.GONORRHOEAE DNA AMP PROB: CPT | Performed by: NURSE PRACTITIONER

## 2019-10-10 PROCEDURE — 87624 HPV HI-RISK TYP POOLED RSLT: CPT | Performed by: NURSE PRACTITIONER

## 2019-10-10 PROCEDURE — 87661 TRICHOMONAS VAGINALIS AMPLIF: CPT | Performed by: NURSE PRACTITIONER

## 2019-10-10 PROCEDURE — 87491 CHLMYD TRACH DNA AMP PROBE: CPT | Performed by: NURSE PRACTITIONER

## 2019-10-10 PROCEDURE — 99395 PREV VISIT EST AGE 18-39: CPT | Performed by: NURSE PRACTITIONER

## 2019-10-10 PROCEDURE — G0123 SCREEN CERV/VAG THIN LAYER: HCPCS | Performed by: NURSE PRACTITIONER

## 2019-10-10 PROCEDURE — 97140 MANUAL THERAPY 1/> REGIONS: CPT | Performed by: PHYSICAL THERAPIST

## 2019-10-10 RX ORDER — IBUPROFEN 800 MG/1
800 TABLET ORAL AS NEEDED
COMMUNITY
Start: 2019-08-10 | End: 2020-02-05

## 2019-10-10 NOTE — PROGRESS NOTES
Outpatient Physical Therapy Ortho Treatment Note       Patient Name: Beverley Silverio  : 1985  MRN: [id number]  Today's Date: 10/10/2019      Visit Date: 10/10/2019    Visit Dx:    ICD-10-CM ICD-9-CM   1. Spondylolisthesis at L5-S1 level M43.17 756.12   2. Pars defect of lumbar spine M43.06 738.4   3. Lumbar disc herniation M51.26 722.10   4. DDD (degenerative disc disease), lumbar M51.36 722.52   5. Chronic midline low back pain without sciatica M54.5 724.2    G89.29 338.29       Patient Active Problem List   Diagnosis   • Left lower quadrant pain   • Nonsmoker   • Obesity, unspecified obesity severity, unspecified obesity type   • Hyperthyroidism   • Weight gain   • Fatigue   • S/P right thyroidectomy        Past Medical History:   Diagnosis Date   • Abnormal Pap smear of cervix    • Anxiety    • Family history of thyroid cancer    • Menorrhagia with irregular cycle    • Migraine    • S/P right thyroidectomy 2019   • Thyroid nodule         Past Surgical History:   Procedure Laterality Date   •  SECTION     • D&C HYSTEROSCOPY ENDOMETRIAL ABLATION N/A 2017    Procedure: DILATATION AND CURETTAGE HYSTEROSCOPY NOVASURE ENDOMETRIAL ABLATION;  Surgeon: Jj Danielson MD;  Location:  [facility] OR;  Service:    • THYROIDECTOMY Right 2019    Procedure: RIGHT THYROIDECTOMY with laryngeal nerve monitor;  Surgeon: Cyrus Gil MD;  Location:  [facility] OR;  Service: ENT   • TUBAL ABDOMINAL LIGATION                         PT Assessment/Plan     Row Name 10/10/19 1412          PT Assessment    Assessment Comments  Today was Mrs. Silverio's first visit since her inital evaluation, therefore, no goals have been met. We focused primarily on reducing soft tissue restrictions in her lumbar and gluteal region and increasing thoracic and hip mobility. Pt. presented w/ moderate guarding in her superior gluteals centeralized in the middle. Pt. had reported difficulty w/ SKTC for HEP and was shown how to  use a towel to modify stretch. Pt. tried it and it did not produce additional symptoms.   -AF        PT Plan    PT Plan Comments  Continue to decrease guarding in her superior gluteals, increase thoracic & hip mobility, begin gentle hip, core, and postural strengthening.   -AF       User Key  (r) = Recorded By, (t) = Taken By, (c) = Cosigned By    Initials Name Provider Type    AF Renee Barraza, CAROLANN Physical Therapy Assistant            OP Exercises     Row Name 10/10/19 1412             Subjective Comments    Subjective Comments  Pt. reports she has been doing 2/3 of her stretches. SKTC has caused her abdominal pain (which she believes may just be due to her menstrual cycle). Her R hip pain has decreased but she has pain in her mid back.   -AF         Subjective Pain    Able to rate subjective pain?  yes  -AF      Pre-Treatment Pain Level  4  -AF      Post-Treatment Pain Level  3  -AF         Total Minutes    45766 - PT Manual Therapy Minutes  40  -AF        User Key  (r) = Recorded By, (t) = Taken By, (c) = Cosigned By    Initials Name Provider Type    AF Renee Barraza, PTA Physical Therapy Assistant                      Manual Rx (last 36 hours)      Manual Treatments     Row Name 10/10/19 1412             Total Minutes    69020 - PT Manual Therapy Minutes  40  -AF         Manual Rx 1    Manual Rx 1 Location  thoracolumbar  -AF      Manual Rx 1 Type  IASTM w/ blue ridged roller, prone  -AF      Manual Rx 1 Duration  12  -AF         Manual Rx 2    Manual Rx 2 Location  upper thoracic and CT  -AF      Manual Rx 2 Type  extenstion mobilizations, prone  -AF      Manual Rx 2 Duration  8  -AF         Manual Rx 3    Manual Rx 3 Location  lower lumbar & superior gluteals  -AF      Manual Rx 3 Type  STM w/ massage cream  -AF      Manual Rx 3 Duration  14  -AF         Manual Rx 4    Manual Rx 4 Location  B hips  -AF      Manual Rx 4 Type  passive stretches: hamstring & DKTC  -AF      Manual Rx 4 Duration  6  -AF         User Key  (r) = Recorded By, (t) = Taken By, (c) = Cosigned By    Initials Name Provider Type    AF Renee Barraza, CAROLANN Physical Therapy Assistant          PT OP Goals     Row Name 10/10/19 1412          PT Short Term Goals    STG Date to Achieve  11/02/19  -AF        Long Term Goals    LTG Date to Achieve  12/02/19  -AF     LTG 1  Pt will score 5% or less on the modified Oswestry index.   -AF     LTG 1 Progress  Ongoing  -AF     LTG 2  Pt will report pain no greater than 1-/2/10 with bending activities.   -AF     LTG 2 Progress  Ongoing  -AF     LTG 3  Pt will be independent with HEP to include hip and core strengthening.   -AF     LTG 3 Progress  Ongoing  -AF     LTG 3 Progress Comments  modified SKTC to include towel to prevent abdominal pain for the time being  -AF     LTG 4  Pt will demonstrate 4/5 or greater glut med strength on RLE.  -AF     LTG 4 Progress  Ongoing  -AF        Time Calculation    PT Goal Re-Cert Due Date  11/02/19  -AF       User Key  (r) = Recorded By, (t) = Taken By, (c) = Cosigned By    Initials Name Provider Type    AF Renee Barraza, PTA Physical Therapy Assistant          Therapy Education  Education Details: modified SKTC to use towel w/   Given: HEP, Symptoms/condition management, Posture/body mechanics  Program: Reinforced, New  How Provided: Verbal, Demonstration  Provided to: Patient  Level of Understanding: Verbalized, Demonstrated              Time Calculation:   PT Non-Billable Time (min): 8 min  Total Timed Code Minutes- PT: 48 minute(s)                Renee Barraza PTA  10/10/2019

## 2019-10-10 NOTE — PATIENT INSTRUCTIONS

## 2019-10-10 NOTE — PROGRESS NOTES
Subjective   Beverley Silverio is a 34 y.o. female.     Annual exam        The following portions of the patient's history were reviewed and updated as appropriate: allergies, current medications, past family history, past medical history, past social history, past surgical history and problem list.    /60 (BP Location: Left arm, Patient Position: Sitting, Cuff Size: Adult)   Wt 88.5 kg (195 lb)   LMP 10/06/2019 (Exact Date)   BMI 36.84 kg/m²     Review of Systems   Constitutional: Negative for activity change, appetite change, fatigue and fever.   HENT: Negative for congestion, sore throat and trouble swallowing.    Eyes: Negative for pain, discharge and visual disturbance.   Respiratory: Negative for apnea, shortness of breath and wheezing.    Cardiovascular: Negative for chest pain, palpitations and leg swelling.   Gastrointestinal: Negative for abdominal pain, constipation and diarrhea.   Genitourinary: Positive for menstrual problem. Negative for frequency, pelvic pain, urgency and vaginal discharge.        3-4 days, light  Painful cramping over the last year.    Musculoskeletal: Negative for back pain and gait problem.   Skin: Negative for color change and rash.   Neurological: Negative for dizziness, weakness and numbness.   Psychiatric/Behavioral: Negative for confusion and sleep disturbance.       Objective   Physical Exam   Constitutional: She is oriented to person, place, and time. She appears well-developed and well-nourished. No distress.   HENT:   Head: Normocephalic.   Right Ear: External ear normal.   Left Ear: External ear normal.   Nose: Nose normal.   Mouth/Throat: Oropharynx is clear and moist.   Eyes: Conjunctivae are normal. Right eye exhibits no discharge. Left eye exhibits no discharge. No scleral icterus.   Neck: Normal range of motion. Neck supple. Carotid bruit is not present. No tracheal deviation present. No thyromegaly present.   Cardiovascular: Normal rate, regular rhythm,  normal heart sounds and intact distal pulses.   No murmur heard.  Pulmonary/Chest: Effort normal and breath sounds normal. No respiratory distress. She has no wheezes. Right breast exhibits no inverted nipple, no mass, no nipple discharge, no skin change and no tenderness. Left breast exhibits no inverted nipple, no mass, no nipple discharge, no skin change and no tenderness. Breasts are symmetrical. There is no breast swelling.   Abdominal: Soft. She exhibits no distension and no mass. There is no tenderness. There is no guarding. No hernia. Hernia confirmed negative in the right inguinal area and confirmed negative in the left inguinal area.   Genitourinary: Rectum normal, vagina normal and uterus normal. Rectal exam shows no mass. No breast tenderness, discharge or bleeding. Pelvic exam was performed with patient supine. There is no rash, tenderness, lesion or injury on the right labia. There is no rash, tenderness, lesion or injury on the left labia. Uterus is not enlarged, not fixed and not tender. Cervix exhibits no motion tenderness, no discharge and no friability. Right adnexum displays no mass, no tenderness and no fullness. Left adnexum displays no mass, no tenderness and no fullness. No erythema, tenderness or bleeding in the vagina. No foreign body in the vagina. No signs of injury around the vagina. No vaginal discharge found.   Genitourinary Comments:   BSU normal  Urethral meatus  Normal  Perineum  Normal   Musculoskeletal: Normal range of motion. She exhibits no edema or tenderness.   Lymphadenopathy:        Head (right side): No submental, no submandibular, no tonsillar, no preauricular, no posterior auricular and no occipital adenopathy present.        Head (left side): No submental, no submandibular, no tonsillar, no preauricular, no posterior auricular and no occipital adenopathy present.     She has no cervical adenopathy.        Right cervical: No superficial cervical, no deep cervical and no  posterior cervical adenopathy present.       Left cervical: No superficial cervical, no deep cervical and no posterior cervical adenopathy present.     She has no axillary adenopathy.        Right: No inguinal adenopathy present.        Left: No inguinal adenopathy present.   Neurological: She is alert and oriented to person, place, and time. Coordination normal.   Skin: Skin is warm and dry. No bruising and no rash noted. She is not diaphoretic. No erythema.   Psychiatric: She has a normal mood and affect. Her behavior is normal. Judgment and thought content normal.   Nursing note and vitals reviewed.      Assessment/Plan   Annual exam. Pap collected, Chlamydia/Gonorrhea/Trichomonas testing added.   Mammogram ordered ([facility] per pt request).     Patient's Body mass index is 36.84 kg/m². BMI is above normal parameters. Recommendations include: educational material.    RV annual exam/prn.   Keep scheduled appt with MD.   Beverley was seen today for gynecologic exam.    Diagnoses and all orders for this visit:    Encounter for gynecological examination without abnormal finding  -     Liquid-based Pap Smear, Screening    Former smoker    Encounter for screening mammogram for malignant neoplasm of breast  -     Mammo Screening Digital Tomosynthesis Bilateral With CAD; Future

## 2019-10-14 ENCOUNTER — TREATMENT (OUTPATIENT)
Dept: PHYSICAL THERAPY | Facility: CLINIC | Age: 34
End: 2019-10-14

## 2019-10-14 DIAGNOSIS — M54.50 CHRONIC MIDLINE LOW BACK PAIN WITHOUT SCIATICA: ICD-10-CM

## 2019-10-14 DIAGNOSIS — M51.26 LUMBAR DISC HERNIATION: ICD-10-CM

## 2019-10-14 DIAGNOSIS — M43.06 PARS DEFECT OF LUMBAR SPINE: ICD-10-CM

## 2019-10-14 DIAGNOSIS — M43.17 SPONDYLOLISTHESIS AT L5-S1 LEVEL: Primary | ICD-10-CM

## 2019-10-14 DIAGNOSIS — M51.36 DDD (DEGENERATIVE DISC DISEASE), LUMBAR: ICD-10-CM

## 2019-10-14 DIAGNOSIS — G89.29 CHRONIC MIDLINE LOW BACK PAIN WITHOUT SCIATICA: ICD-10-CM

## 2019-10-14 PROCEDURE — 97140 MANUAL THERAPY 1/> REGIONS: CPT | Performed by: PHYSICAL THERAPIST

## 2019-10-14 PROCEDURE — 97110 THERAPEUTIC EXERCISES: CPT | Performed by: PHYSICAL THERAPIST

## 2019-10-14 NOTE — PROGRESS NOTES
Outpatient Physical Therapy Ortho Treatment Note       Patient Name: Beverley Silverio  : 1985  MRN: [id number]  Today's Date: 10/14/2019      Visit Date: 10/14/2019    Visit Dx:    ICD-10-CM ICD-9-CM   1. Spondylolisthesis at L5-S1 level M43.17 756.12   2. Pars defect of lumbar spine M43.06 738.4   3. Lumbar disc herniation M51.26 722.10   4. DDD (degenerative disc disease), lumbar M51.36 722.52   5. Chronic midline low back pain without sciatica M54.5 724.2    G89.29 338.29       Patient Active Problem List   Diagnosis   • Left lower quadrant pain   • Nonsmoker   • Obesity, unspecified obesity severity, unspecified obesity type   • Hyperthyroidism   • Weight gain   • Fatigue   • S/P right thyroidectomy        Past Medical History:   Diagnosis Date   • Abnormal Pap smear of cervix    • Anxiety    • Family history of thyroid cancer    • Menorrhagia with irregular cycle    • Migraine    • S/P right thyroidectomy 2019   • Thyroid nodule         Past Surgical History:   Procedure Laterality Date   •  SECTION     • D&C HYSTEROSCOPY ENDOMETRIAL ABLATION N/A 2017    Procedure: DILATATION AND CURETTAGE HYSTEROSCOPY NOVASURE ENDOMETRIAL ABLATION;  Surgeon: Jj Danielson MD;  Location: [facility] OR;  Service:    • THYROIDECTOMY Right 2019    Procedure: RIGHT THYROIDECTOMY with laryngeal nerve monitor;  Surgeon: Cyrus Gil MD;  Location:  [facility] OR;  Service: ENT   • TUBAL ABDOMINAL LIGATION                         PT Assessment/Plan     Row Name 10/14/19 0657          PT Assessment    Assessment Comments  She continues to have thorcolumbar restrictions and pain resting at 3/10. We were able to prgoress with core and hip strengthening without increase in symptoms.   -KR        PT Plan    PT Plan Comments  Conitnue to progress with hip and core strengtheing. Conitnue to work on thoracolumbar restrictions and hip mobility.   -KR       User Key  (r) = Recorded By, (t) = Taken By, (c) =  Cosigned By    Initials Name Provider Type    Lina Taylor, PT DPT Physical Therapist            OP Exercises     Row Name 10/14/19 0657             Subjective Comments    Subjective Comments  She reports soreness in the middle of her back. She reports SKTC feels better now that it has been modified.   -KR         Subjective Pain    Able to rate subjective pain?  yes  -KR      Pre-Treatment Pain Level  3  -KR         Total Minutes    77682 - PT Therapeutic Exercise Minutes  15  -KR      30467 - PT Manual Therapy Minutes  25  -KR         Exercise 1    Exercise Name 1  TA review  -KR         Exercise 2    Exercise Name 2  BKFO   -KR      Cueing 2  Verbal  -KR      Sets 2  2  -KR      Reps 2  10  -KR         Exercise 3    Exercise Name 3  hooklying clams with red TB  -KR      Cueing 3  Verbal  -KR      Sets 3  2  -KR      Reps 3  10  -KR        User Key  (r) = Recorded By, (t) = Taken By, (c) = Cosigned By    Initials Name Provider Type    Lina Taylor, PT DPT Physical Therapist                      Manual Rx (last 36 hours)      Manual Treatments     Row Name 10/14/19 0657             Total Minutes    65912 - PT Manual Therapy Minutes  25  -KR         Manual Rx 1    Manual Rx 1 Location  thoracolumbar  -KR      Manual Rx 1 Type  IASTM w/ blue ridged roller, prone  -KR      Manual Rx 1 Duration  --  -KR         Manual Rx 2    Manual Rx 2 Location  upper thoracic and CT  -KR      Manual Rx 2 Type  extenstion mobilizations, prone  -KR      Manual Rx 2 Duration  --  -KR         Manual Rx 3    Manual Rx 3 Location  lower lumbar & superior gluteals  -KR      Manual Rx 3 Type  STM w/ massage cream  -KR      Manual Rx 3 Duration  --  -KR         Manual Rx 4    Manual Rx 4 Location  B hips  -KR      Manual Rx 4 Type  passive stretches: hamstring & DKTC  -KR      Manual Rx 4 Duration  --  -KR        User Key  (r) = Recorded By, (t) = Taken By, (c) = Cosigned By    Initials Name Provider Type    GARCIA Gonzalez  Lina BEAR PT DPT Physical Therapist          PT OP Goals     Row Name 10/14/19 0657          PT Short Term Goals    STG Date to Achieve  11/02/19  -KR        Long Term Goals    LTG Date to Achieve  12/02/19  -KR     LTG 1  Pt will score 5% or less on the modified Oswestry index.   -KR     LTG 1 Progress  Ongoing  -KR     LTG 2  Pt will report pain no greater than 1-/2/10 with bending activities.   -KR     LTG 2 Progress  Ongoing  -KR     LTG 2 Progress Comments  3/10 pain at rest today  -KR     LTG 3  Pt will be independent with HEP to include hip and core strengthening.   -KR     LTG 3 Progress  Ongoing  -KR     LTG 4  Pt will demonstrate 4/5 or greater glut med strength on RLE.  -KR     LTG 4 Progress  Ongoing  -KR        Time Calculation    PT Goal Re-Cert Due Date  11/02/19  -KR       User Key  (r) = Recorded By, (t) = Taken By, (c) = Cosigned By    Initials Name Provider Type    Lina Taylor, PT DPT Physical Therapist          Therapy Education  Given: HEP, Symptoms/condition management, Posture/body mechanics  Program: Reinforced  How Provided: Verbal  Provided to: Patient  Level of Understanding: Verbalized              Time Calculation:   Total Timed Code Minutes- PT: 40 minute(s)                Lina Gonzalez, PT DPT  10/14/2019

## 2019-10-15 LAB
C TRACH RRNA CVX QL NAA+PROBE: NOT DETECTED
GEN CATEG CVX/VAG CYTO-IMP: NORMAL
HPV I/H RISK 4 DNA CVX QL PROBE+SIG AMP: NOT DETECTED
LAB AP CASE REPORT: NORMAL
LAB AP GYN ADDITIONAL INFORMATION: NORMAL
LAB AP GYN OTHER FINDINGS: NORMAL
N GONORRHOEA RRNA SPEC QL NAA+PROBE: NOT DETECTED
PATH INTERP SPEC-IMP: NORMAL
STAT OF ADQ CVX/VAG CYTO-IMP: NORMAL
TRICHOMONAS VAGINALIS PCR: NOT DETECTED

## 2019-10-16 ENCOUNTER — TREATMENT (OUTPATIENT)
Dept: PHYSICAL THERAPY | Facility: CLINIC | Age: 34
End: 2019-10-16

## 2019-10-16 DIAGNOSIS — M43.06 PARS DEFECT OF LUMBAR SPINE: ICD-10-CM

## 2019-10-16 DIAGNOSIS — M51.26 LUMBAR DISC HERNIATION: ICD-10-CM

## 2019-10-16 DIAGNOSIS — M54.50 CHRONIC MIDLINE LOW BACK PAIN WITHOUT SCIATICA: ICD-10-CM

## 2019-10-16 DIAGNOSIS — G89.29 CHRONIC MIDLINE LOW BACK PAIN WITHOUT SCIATICA: ICD-10-CM

## 2019-10-16 DIAGNOSIS — M51.36 DDD (DEGENERATIVE DISC DISEASE), LUMBAR: ICD-10-CM

## 2019-10-16 DIAGNOSIS — M43.17 SPONDYLOLISTHESIS AT L5-S1 LEVEL: Primary | ICD-10-CM

## 2019-10-16 PROCEDURE — 97140 MANUAL THERAPY 1/> REGIONS: CPT | Performed by: PHYSICAL THERAPIST

## 2019-10-16 PROCEDURE — 97110 THERAPEUTIC EXERCISES: CPT | Performed by: PHYSICAL THERAPIST

## 2019-10-16 NOTE — PROGRESS NOTES
Outpatient Physical Therapy Ortho Treatment Note       Patient Name: Beverley Silverio  : 1985  MRN: [id number]  Today's Date: 10/16/2019      Visit Date: 10/16/2019    Visit Dx:    ICD-10-CM ICD-9-CM   1. Spondylolisthesis at L5-S1 level M43.17 756.12   2. Pars defect of lumbar spine M43.06 738.4   3. Lumbar disc herniation M51.26 722.10   4. DDD (degenerative disc disease), lumbar M51.36 722.52   5. Chronic midline low back pain without sciatica M54.5 724.2    G89.29 338.29       Patient Active Problem List   Diagnosis   • Left lower quadrant pain   • Nonsmoker   • Obesity, unspecified obesity severity, unspecified obesity type   • Hyperthyroidism   • Weight gain   • Fatigue   • S/P right thyroidectomy        Past Medical History:   Diagnosis Date   • Abnormal Pap smear of cervix    • Anxiety    • Family history of thyroid cancer    • Menorrhagia with irregular cycle    • Migraine    • S/P right thyroidectomy 2019   • Thyroid nodule         Past Surgical History:   Procedure Laterality Date   •  SECTION     • D&C HYSTEROSCOPY ENDOMETRIAL ABLATION N/A 2017    Procedure: DILATATION AND CURETTAGE HYSTEROSCOPY NOVASURE ENDOMETRIAL ABLATION;  Surgeon: Jj Danielson MD;  Location:  [facility] OR;  Service:    • THYROIDECTOMY Right 2019    Procedure: RIGHT THYROIDECTOMY with laryngeal nerve monitor;  Surgeon: Cyrus Gil MD;  Location:  [facility] OR;  Service: ENT   • TUBAL ABDOMINAL LIGATION                         PT Assessment/Plan     Row Name 10/16/19 1352          PT Assessment    Assessment Comments  We continued to focus on decreasing soft tissue restrictions, increasing hip mobility, and progressing hip strength. Pt. continues to present w/ guarding in her superior gluteals centrally. Her CT also continues to be very stiff. Pt. did very well w/ her exercises and is motivated to try some at home.   -AF        PT Plan    PT Plan Comments  Continue to progress hip strength,  decrease guarding in superior gluteals (consider small black ridged roller), and increasce thoracic and CT mobility. Consider bolstering HEP.   -AF       User Key  (r) = Recorded By, (t) = Taken By, (c) = Cosigned By    Initials Name Provider Type    AF [place], Renee, PTA Physical Therapy Assistant            OP Exercises     Row Name 10/16/19 1532 10/16/19 1352          Subjective Comments    Subjective Comments  Pt. reports increased pain last night, rating it about a 5 or 6, laying in bed. Lying on her L side hurt the worst. Last night was the first night in a while that this had occurred. Pt. is interested in trying yoga.   -AF  --        Subjective Pain    Able to rate subjective pain?  yes  -AF  --     Pre-Treatment Pain Level  -- 2-3  -AF  --     Post-Treatment Pain Level  -- 1-2  -AF  --        Total Minutes    11792 - PT Therapeutic Exercise Minutes  24  -AF  --     90978 - PT Manual Therapy Minutes  24  -AF  --  -AF        Exercise 1    Exercise Name 1  B hamstring stretch  -AF  --     Cueing 1  Verbal;Tactile  -AF  --     Sets 1  3  -AF  --     Time 1  30 sec  -AF  --        Exercise 2    Exercise Name 2  heel slides on ball  -AF  --     Cueing 2  Verbal;Demo  -AF  --     Sets 2  2  -AF  --     Reps 2  10  -AF  --        Exercise 3    Exercise Name 3  PPT   -AF  --     Cueing 3  Verbal;Demo  -AF  --     Sets 3  2  -AF  --     Reps 3  10  -AF  --        Exercise 4    Exercise Name 4  mini-bridges  -AF  --     Cueing 4  Verbal;Demo  -AF  --     Sets 4  2  -AF  --     Reps 4  10  -AF  --        Exercise 5    Exercise Name 5  BKFO against red TB  -AF  --     Cueing 5  Verbal  -AF  --     Sets 5  2  -AF  --     Reps 5  10  -AF  --       User Key  (r) = Recorded By, (t) = Taken By, (c) = Cosigned By    Initials Name Provider Type    AF [place], Renee, PTA Physical Therapy Assistant                      Manual Rx (last 36 hours)      Manual Treatments     Row Name 10/16/19 1532 10/16/19 1352           Total Minutes    43316 - PT Manual Therapy Minutes  24  -AF  --  -AF        Manual Rx 1    Manual Rx 1 Location  thoracolumbar  -AF  --  -AF     Manual Rx 1 Type  IASTM w/ blue ridged roller, prone  -AF  --  -AF     Manual Rx 1 Duration  6  -AF  --  -AF        Manual Rx 2    Manual Rx 2 Location  upper thoracic and CT  -AF  --  -AF     Manual Rx 2 Type  extenstion mobilizations, prone  -AF  --  -AF     Manual Rx 2 Duration  8  -AF  --  -AF        Manual Rx 3    Manual Rx 3 Location  lower lumbar & superior gluteals  -AF  --  -AF     Manual Rx 3 Type  STM w/ massage cream  -AF  --  -AF     Manual Rx 3 Duration  10  -AF  --  -AF       User Key  (r) = Recorded By, (t) = Taken By, (c) = Cosigned By    Initials Name Provider Type    AF Christi Renee, PTA Physical Therapy Assistant          PT OP Goals     Row Name 10/16/19 1532          PT Short Term Goals    STG Date to Achieve  11/02/19  -AF        Long Term Goals    LTG Date to Achieve  12/02/19  -AF     LTG 1  Pt will score 5% or less on the modified Oswestry index.   -AF     LTG 1 Progress  Ongoing  -AF     LTG 2  Pt will report pain no greater than 1-/2/10 with bending activities.   -AF     LTG 2 Progress  Ongoing  -AF     LTG 3  Pt will be independent with HEP to include hip and core strengthening.   -AF     LTG 3 Progress  Ongoing  -AF     LTG 3 Progress Comments  Pt. continues to reported modified stretch is working.   -AF     LTG 4  Pt will demonstrate 4/5 or greater glut med strength on RLE.  -AF     LTG 4 Progress  Ongoing  -AF        Time Calculation    PT Goal Re-Cert Due Date  11/02/19  -AF       User Key  (r) = Recorded By, (t) = Taken By, (c) = Cosigned By    Initials Name Provider Type    AF Christi, Renee, PTA Physical Therapy Assistant          Therapy Education  Given: HEP, Symptoms/condition management, Pain management, Posture/body mechanics  Program: Reinforced  How Provided: Verbal, Demonstration  Provided to: Patient  Level of  Understanding: Verbalized              Time Calculation:   Total Timed Code Minutes- PT: 48 minute(s)                Renee Barraza, PTA  10/16/2019

## 2019-10-21 ENCOUNTER — TREATMENT (OUTPATIENT)
Dept: PHYSICAL THERAPY | Facility: CLINIC | Age: 34
End: 2019-10-21

## 2019-10-21 DIAGNOSIS — M43.17 SPONDYLOLISTHESIS AT L5-S1 LEVEL: Primary | ICD-10-CM

## 2019-10-21 DIAGNOSIS — M54.50 CHRONIC MIDLINE LOW BACK PAIN WITHOUT SCIATICA: ICD-10-CM

## 2019-10-21 DIAGNOSIS — M51.26 LUMBAR DISC HERNIATION: ICD-10-CM

## 2019-10-21 DIAGNOSIS — M43.06 PARS DEFECT OF LUMBAR SPINE: ICD-10-CM

## 2019-10-21 DIAGNOSIS — G89.29 CHRONIC MIDLINE LOW BACK PAIN WITHOUT SCIATICA: ICD-10-CM

## 2019-10-21 DIAGNOSIS — M51.36 DDD (DEGENERATIVE DISC DISEASE), LUMBAR: ICD-10-CM

## 2019-10-21 PROCEDURE — 97140 MANUAL THERAPY 1/> REGIONS: CPT | Performed by: PHYSICAL THERAPIST

## 2019-10-21 PROCEDURE — 97110 THERAPEUTIC EXERCISES: CPT | Performed by: PHYSICAL THERAPIST

## 2019-10-21 NOTE — PROGRESS NOTES
Outpatient Physical Therapy Ortho Treatment Note       Patient Name: Beverley Silverio  : 1985  MRN: [id number]  Today's Date: 10/21/2019      Visit Date: 10/21/2019    Visit Dx:    ICD-10-CM ICD-9-CM   1. Spondylolisthesis at L5-S1 level M43.17 756.12   2. Pars defect of lumbar spine M43.06 738.4   3. Lumbar disc herniation M51.26 722.10   4. DDD (degenerative disc disease), lumbar M51.36 722.52   5. Chronic midline low back pain without sciatica M54.5 724.2    G89.29 338.29       Patient Active Problem List   Diagnosis   • Left lower quadrant pain   • Nonsmoker   • Obesity, unspecified obesity severity, unspecified obesity type   • Hyperthyroidism   • Weight gain   • Fatigue   • S/P right thyroidectomy        Past Medical History:   Diagnosis Date   • Abnormal Pap smear of cervix    • Anxiety    • Family history of thyroid cancer    • Menorrhagia with irregular cycle    • Migraine    • S/P right thyroidectomy 2019   • Thyroid nodule         Past Surgical History:   Procedure Laterality Date   •  SECTION     • D&C HYSTEROSCOPY ENDOMETRIAL ABLATION N/A 2017    Procedure: DILATATION AND CURETTAGE HYSTEROSCOPY NOVASURE ENDOMETRIAL ABLATION;  Surgeon: Jj Danielson MD;  Location: [facility] OR;  Service:    • THYROIDECTOMY Right 2019    Procedure: RIGHT THYROIDECTOMY with laryngeal nerve monitor;  Surgeon: Cyrus Gil MD;  Location: [facility] OR;  Service: ENT   • TUBAL ABDOMINAL LIGATION                         PT Assessment/Plan     Row Name 10/21/19 0659          PT Assessment    Assessment Comments  She conitnues to have intermittent pain during the day and night. She continues to have decreased core and hip strength causing increased strain on her lower back and gluteal muscles.   -KR        PT Plan    PT Plan Comments  Conitnue to work on gluteal and thorcolumbar soft tissue restrictions, as well as thoracic mobiltiy. Progressing with core strengthening and towards more  functional activities, including progressive core strengthening in standing. Progress HEP as needed. Assess sleeping posture.   -KR       User Key  (r) = Recorded By, (t) = Taken By, (c) = Cosigned By    Initials Name Provider Type    Lina Taylor, PT DPT Physical Therapist            OP Exercises     Row Name 10/21/19 0659             Subjective Comments    Subjective Comments  She reports pain intermittently throughout the night, she does tend to toss and turn. Up to 5/10 at night. 4/10 during the day.   -KR         Subjective Pain    Able to rate subjective pain?  yes  -KR      Pre-Treatment Pain Level  2  -KR         Total Minutes    72785 - PT Therapeutic Exercise Minutes  21  -KR      53279 - PT Manual Therapy Minutes  25  -KR         Exercise 1    Exercise Name 1  heel slides on ball (B)  -KR      Cueing 1  Verbal  -KR      Sets 1  2  -KR      Reps 1  10  -KR         Exercise 2    Exercise Name 2  mini-bridges  -KR      Cueing 2  Verbal  -KR      Sets 2  2  -KR      Reps 2  10  -KR         Exercise 3    Exercise Name 3  TA marches  -KR      Cueing 3  Verbal  -KR      Sets 3  2  -KR      Reps 3  20 total  -KR         Exercise 4    Exercise Name 4  HL 1/2 foam roll thoracic extension stretch  -KR      Cueing 4  Verbal  -KR      Time 4  2 minutes  -KR         Exercise 5    Exercise Name 5  HL 1/2 foam roll PPT  -KR      Cueing 5  Verbal  -KR      Sets 5  2  -KR      Reps 5  10  -KR        User Key  (r) = Recorded By, (t) = Taken By, (c) = Cosigned By    Initials Name Provider Type    Lina Taylor, PT DPT Physical Therapist                      Manual Rx (last 36 hours)      Manual Treatments     Row Name 10/21/19 0659             Total Minutes    55524 - PT Manual Therapy Minutes  25  -KR         Manual Rx 1    Manual Rx 1 Location  thoracolumbar  -KR      Manual Rx 1 Type  IASTM w/ blue ridged roller, prone  -KR      Manual Rx 1 Duration  --  -KR         Manual Rx 2    Manual Rx 2 Location   upper thoracic and CT  -KR      Manual Rx 2 Type  extenstion mobilizations, prone  -KR      Manual Rx 2 Grade  2/3 no cavitations  -KR      Manual Rx 2 Duration  --  -KR         Manual Rx 3    Manual Rx 3 Location  lower lumbar & superior gluteals  -KR      Manual Rx 3 Type  STM w/ small black ridged roller  -KR      Manual Rx 3 Duration  --  -KR        User Key  (r) = Recorded By, (t) = Taken By, (c) = Cosigned By    Initials Name Provider Type    Lina Taylor, PT DPT Physical Therapist          PT OP Goals     Row Name 10/21/19 0659          PT Short Term Goals    STG Date to Achieve  11/02/19  -KR        Long Term Goals    LTG Date to Achieve  12/02/19  -KR     LTG 1  Pt will score 5% or less on the modified Oswestry index.   -KR     LTG 1 Progress  Ongoing  -KR     LTG 2  Pt will report pain no greater than 1-/2/10 with bending activities.   -KR     LTG 2 Progress  Ongoing  -KR     LTG 2 Progress Comments  2-5/10 depending on time of day  -KR     LTG 3  Pt will be independent with HEP to include hip and core strengthening.   -KR     LTG 3 Progress  Ongoing  -KR     LTG 4  Pt will demonstrate 4/5 or greater glut med strength on RLE.  -KR     LTG 4 Progress  Ongoing  -KR        Time Calculation    PT Goal Re-Cert Due Date  11/02/19  -KR       User Key  (r) = Recorded By, (t) = Taken By, (c) = Cosigned By    Initials Name Provider Type    Lina Taylor, PT DPT Physical Therapist          Therapy Education  Education Details: roel, BKBRITTANI, TA march  Given: HEP, Symptoms/condition management  Program: Reinforced  How Provided: Verbal  Provided to: Patient  Level of Understanding: Verbalized              Time Calculation:   Total Timed Code Minutes- PT: 46 minute(s)                Lina Gonzalez, PT DPT  10/21/2019

## 2019-10-23 ENCOUNTER — TREATMENT (OUTPATIENT)
Dept: PHYSICAL THERAPY | Facility: CLINIC | Age: 34
End: 2019-10-23

## 2019-10-23 DIAGNOSIS — M43.06 PARS DEFECT OF LUMBAR SPINE: ICD-10-CM

## 2019-10-23 DIAGNOSIS — G89.29 CHRONIC MIDLINE LOW BACK PAIN WITHOUT SCIATICA: ICD-10-CM

## 2019-10-23 DIAGNOSIS — M54.50 CHRONIC MIDLINE LOW BACK PAIN WITHOUT SCIATICA: ICD-10-CM

## 2019-10-23 DIAGNOSIS — M43.17 SPONDYLOLISTHESIS AT L5-S1 LEVEL: Primary | ICD-10-CM

## 2019-10-23 DIAGNOSIS — M51.36 DDD (DEGENERATIVE DISC DISEASE), LUMBAR: ICD-10-CM

## 2019-10-23 DIAGNOSIS — M51.26 LUMBAR DISC HERNIATION: ICD-10-CM

## 2019-10-23 PROCEDURE — 97140 MANUAL THERAPY 1/> REGIONS: CPT | Performed by: PHYSICAL THERAPIST

## 2019-10-23 PROCEDURE — 97110 THERAPEUTIC EXERCISES: CPT | Performed by: PHYSICAL THERAPIST

## 2019-10-23 NOTE — PROGRESS NOTES
Outpatient Physical Therapy Ortho Treatment Note       Patient Name: Beverley Silverio  : 1985  MRN: [id number]  Today's Date: 10/23/2019      Visit Date: 10/23/2019    Visit Dx:    ICD-10-CM ICD-9-CM   1. Spondylolisthesis at L5-S1 level M43.17 756.12   2. Pars defect of lumbar spine M43.06 738.4   3. Lumbar disc herniation M51.26 722.10   4. DDD (degenerative disc disease), lumbar M51.36 722.52   5. Chronic midline low back pain without sciatica M54.5 724.2    G89.29 338.29       Patient Active Problem List   Diagnosis   • Left lower quadrant pain   • Nonsmoker   • Obesity, unspecified obesity severity, unspecified obesity type   • Hyperthyroidism   • Weight gain   • Fatigue   • S/P right thyroidectomy        Past Medical History:   Diagnosis Date   • Abnormal Pap smear of cervix    • Anxiety    • Family history of thyroid cancer    • Menorrhagia with irregular cycle    • Migraine    • S/P right thyroidectomy 2019   • Thyroid nodule         Past Surgical History:   Procedure Laterality Date   •  SECTION     • D&C HYSTEROSCOPY ENDOMETRIAL ABLATION N/A 2017    Procedure: DILATATION AND CURETTAGE HYSTEROSCOPY NOVASURE ENDOMETRIAL ABLATION;  Surgeon: Jj Danielson MD;  Location: [facility] OR;  Service:    • THYROIDECTOMY Right 2019    Procedure: RIGHT THYROIDECTOMY with laryngeal nerve monitor;  Surgeon: Cyrus Gil MD;  Location: [facility] OR;  Service: ENT   • TUBAL ABDOMINAL LIGATION                         PT Assessment/Plan     Row Name 10/23/19 1534          PT Assessment    Assessment Comments  Today we progressed core strengthening and also worked on her soft tissue restrictions around her R gluteals. Pt. reported a relief of symptoms post-session.   -AF        PT Plan    PT Plan Comments  Continue to work on core strengthening and lumbar/gluteal guarding. Bolster HEP to reflect.   -AF       User Key  (r) = Recorded By, (t) = Taken By, (c) = Cosigned By    Initials Name  "Provider Type    AF Conway, Renee, PTA Physical Therapy Assistant            OP Exercises     Row Name 10/23/19 9201             Subjective Comments    Subjective Comments  Pt. describes her R low back pain as \"sharp\" today in a very specific place.   -AF         Subjective Pain    Able to rate subjective pain?  yes  -AF      Pre-Treatment Pain Level  4  -AF      Post-Treatment Pain Level  2  -AF      Subjective Pain Comment  R low back   -AF         Total Minutes    99421 - PT Therapeutic Exercise Minutes  30  -AF      55608 - PT Manual Therapy Minutes  14  -AF         Exercise 1    Exercise Name 1  heel slides on ball w/ B UE flexion  -AF      Cueing 1  Verbal;Demo  -AF      Sets 1  2  -AF      Reps 1  10  -AF      Additional Comments  2# wand  -AF         Exercise 2    Exercise Name 2  LTR w/ BLE on big orange ball  -AF      Cueing 2  Verbal;Tactile  -AF      Sets 2  2  -AF      Reps 2  10  -AF         Exercise 3    Exercise Name 3  PPT  -AF      Cueing 3  Verbal  -AF      Sets 3  2  -AF      Reps 3  10  -AF         Exercise 4    Exercise Name 4  bridges  -AF      Cueing 4  Verbal;Demo  -AF      Sets 4  2  -AF      Reps 4  10  -AF         Exercise 5    Exercise Name 5  seated marches on ball  -AF      Cueing 5  Verbal;Demo  -AF      Sets 5  2  -AF      Reps 5  10  -AF         Exercise 6    Exercise Name 6  russian twists on ball  -AF      Cueing 6  Verbal;Demo  -AF      Sets 6  2  -AF      Reps 6  10  -AF      Additional Comments  2# med ball  -AF         Exercise 7    Exercise Name 7  ab roll outs on ball  -AF      Cueing 7  Verbal;Demo  -AF      Sets 7  2  -AF      Reps 7  10  -AF         Exercise 8    Exercise Name 8  lumbar extension prone over ball  -AF      Cueing 8  Verbal;Demo  -AF      Sets 8  2  -AF      Reps 8  10  -AF         Exercise 9    Exercise Name 9  standing side bends  -AF      Cueing 9  Verbal;Demo  -AF      Sets 9  2  -AF      Reps 9  10  -AF      Additional Comments  red TB  -AF      "   User Key  (r) = Recorded By, (t) = Taken By, (c) = Cosigned By    Initials Name Provider Type    AF [place], Renee, PTA Physical Therapy Assistant                      Manual Rx (last 36 hours)      Manual Treatments     Row Name 10/23/19 1534             Total Minutes    31459 - PT Manual Therapy Minutes  14  -AF         Manual Rx 1    Manual Rx 1 Location  lower lumbar & superior gluteals (R >L)  -AF      Manual Rx 1 Type  STM w/ small black ridged roller  -AF      Manual Rx 1 Duration  14  -AF        User Key  (r) = Recorded By, (t) = Taken By, (c) = Cosigned By    Initials Name Provider Type    AF [place], Renee, PTA Physical Therapy Assistant          PT OP Goals     Row Name 10/23/19 1534          PT Short Term Goals    STG Date to Achieve  11/02/19  -AF        Long Term Goals    LTG Date to Achieve  12/02/19  -AF     LTG 1  Pt will score 5% or less on the modified Oswestry index.   -AF     LTG 1 Progress  Ongoing  -AF     LTG 2  Pt will report pain no greater than 1-/2/10 with bending activities.   -AF     LTG 2 Progress  Ongoing  -AF     LTG 2 Progress Comments  Pt. did not report pain performing side bends.  -AF     LTG 3  Pt will be independent with HEP to include hip and core strengthening.   -AF     LTG 3 Progress  Ongoing  -AF     LTG 4  Pt will demonstrate 4/5 or greater glut med strength on RLE.  -AF     LTG 4 Progress  Ongoing  -AF        Time Calculation    PT Goal Re-Cert Due Date  11/02/19  -AF       User Key  (r) = Recorded By, (t) = Taken By, (c) = Cosigned By    Initials Name Provider Type    AF [place], Renee, PTA Physical Therapy Assistant          Therapy Education  Education Details: encouraged pt. to sit against a vertical towel roll to improve posture on her drive to [place] this evening for work  Given: Posture/body mechanics, Symptoms/condition management  Program: New  How Provided: Verbal, Demonstration  Provided to: Patient  Level of Understanding: Verbalized               Time Calculation:   Total Timed Code Minutes- PT: 44 minute(s)                Renee Barraza, PTA  10/23/2019

## 2019-10-28 ENCOUNTER — TREATMENT (OUTPATIENT)
Dept: PHYSICAL THERAPY | Facility: CLINIC | Age: 34
End: 2019-10-28

## 2019-10-28 DIAGNOSIS — M54.50 CHRONIC MIDLINE LOW BACK PAIN WITHOUT SCIATICA: ICD-10-CM

## 2019-10-28 DIAGNOSIS — M43.17 SPONDYLOLISTHESIS AT L5-S1 LEVEL: Primary | ICD-10-CM

## 2019-10-28 DIAGNOSIS — M43.06 PARS DEFECT OF LUMBAR SPINE: ICD-10-CM

## 2019-10-28 DIAGNOSIS — M51.26 LUMBAR DISC HERNIATION: ICD-10-CM

## 2019-10-28 DIAGNOSIS — M51.36 DDD (DEGENERATIVE DISC DISEASE), LUMBAR: ICD-10-CM

## 2019-10-28 DIAGNOSIS — G89.29 CHRONIC MIDLINE LOW BACK PAIN WITHOUT SCIATICA: ICD-10-CM

## 2019-10-28 PROCEDURE — 97140 MANUAL THERAPY 1/> REGIONS: CPT | Performed by: PHYSICAL THERAPIST

## 2019-10-28 PROCEDURE — 97110 THERAPEUTIC EXERCISES: CPT | Performed by: PHYSICAL THERAPIST

## 2019-10-28 NOTE — PROGRESS NOTES
Outpatient Physical Therapy Ortho Progress Note       Patient Name: Beverley Silverio  : 1985  MRN: [id number]  Today's Date: 10/28/2019      Visit Date: 10/28/2019    Visit Dx:    ICD-10-CM ICD-9-CM   1. Spondylolisthesis at L5-S1 level M43.17 756.12   2. Pars defect of lumbar spine M43.06 738.4   3. Lumbar disc herniation M51.26 722.10   4. DDD (degenerative disc disease), lumbar M51.36 722.52   5. Chronic midline low back pain without sciatica M54.5 724.2    G89.29 338.29       Patient Active Problem List   Diagnosis   • Left lower quadrant pain   • Nonsmoker   • Obesity, unspecified obesity severity, unspecified obesity type   • Hyperthyroidism   • Weight gain   • Fatigue   • S/P right thyroidectomy        Past Medical History:   Diagnosis Date   • Abnormal Pap smear of cervix    • Anxiety    • Family history of thyroid cancer    • Menorrhagia with irregular cycle    • Migraine    • S/P right thyroidectomy 2019   • Thyroid nodule         Past Surgical History:   Procedure Laterality Date   •  SECTION     • D&C HYSTEROSCOPY ENDOMETRIAL ABLATION N/A 2017    Procedure: DILATATION AND CURETTAGE HYSTEROSCOPY NOVASURE ENDOMETRIAL ABLATION;  Surgeon: Jj Danielson MD;  Location: [facility] OR;  Service:    • THYROIDECTOMY Right 2019    Procedure: RIGHT THYROIDECTOMY with laryngeal nerve monitor;  Surgeon: Cyrus Gil MD;  Location: [facility] OR;  Service: ENT   • TUBAL ABDOMINAL LIGATION                         PT Assessment/Plan     Row Name 10/28/19 0745          PT Assessment    Functional Limitations  Limitation in home management;Limitations in community activities;Performance in leisure activities  -KR     Impairments  Pain;Muscle strength;Joint mobility;Posture;Range of motion  -KR     Assessment Comments  She is reporting 40% improvement since starting therapy, however her Oswestry score is unchanged at 12%. Her hips are still weak and her posture is still placing increased  strain on her lower back. We are starting to progress wtih core stability, which was reflective today and with her updated HEP. Her thoracic spine was moving better today.   -KR     Please refer to paper survey for additional self-reported information  Yes  -KR     Rehab Potential  Good  -KR     Patient/caregiver participated in establishment of treatment plan and goals  Yes  -KR     Patient would benefit from skilled therapy intervention  Yes  -KR        PT Plan    Predicted Duration of Therapy Intervention (Therapy Eval)  6-8 visits  -KR     Planned CPT's?  PT EVAL LOW COMPLEXITY: 81222;PT RE-EVAL: 06998;PT THER PROC EA 15 MIN: 07790;PT THER ACT EA 15 MIN: 88940;PT MANUAL THERAPY EA 15 MIN: 97456;PT NEUROMUSC RE-EDUCATION EA 15 MIN: 22583;PT GAIT TRAINING EA 15 MIN: 50153;PT ELECTRICAL STIM UNATTEND: ;PT ELECTRICAL STIM ATTD EA 15 MIN: 11917;PT ULTRASOUND EA 15 MIN: 11240  -KR     PT Plan Comments  Conitnue to work on glut and lumbar paraspinal soft tissue restrictions. Review HEP, if needed. Progress with core stability activities and towards more functional training.   -KR       User Key  (r) = Recorded By, (t) = Taken By, (c) = Cosigned By    Initials Name Provider Type    KR Lina Gonzalez, PT DPT Physical Therapist            OP Exercises     Row Name 10/28/19 0745             Subjective Comments    Subjective Comments  She reports pain Friday and into Saturday morning, up to 7/10. She reports 40% improvements. She reports still having trouble bending/lifting, epsecially when her back is hurting.   -KR         Subjective Pain    Able to rate subjective pain?  yes  -KR      Pre-Treatment Pain Level  3  -KR         Total Minutes    72938 - PT Therapeutic Exercise Minutes  29  -KR      92451 - PT Manual Therapy Minutes  15  -KR         Exercise 1    Exercise Name 1  addressed all goals for progress note  -KR         Exercise 2    Exercise Name 2  bridge on orange SB  -KR      Cueing 2  Verbal  -KR       Sets 2  2  -KR      Reps 2  10  -KR         Exercise 3    Exercise Name 3  TA marches on orange SB  -KR      Cueing 3  Verbal  -KR      Sets 3  2  -KR      Reps 3  10 each leg  -KR         Exercise 4    Exercise Name 4  side bridges  -KR      Cueing 4  Verbal  -KR      Sets 4  2  -KR      Reps 4  10  -KR      Time 4  3 sec holds  -KR         Exercise 5    Exercise Name 5  quadruped LE kicks  -KR      Cueing 5  Verbal  -KR      Sets 5  2  -KR      Reps 5  10 each leg alternating  -KR         Exercise 6    Exercise Name 6  standing hip abduction  -KR      Cueing 6  Verbal  -KR        User Key  (r) = Recorded By, (t) = Taken By, (c) = Cosigned By    Initials Name Provider Type    Lina Taylor, PT DPT Physical Therapist                      Manual Rx (last 36 hours)      Manual Treatments     Row Name 10/28/19 0745             Total Minutes    77189 - PT Manual Therapy Minutes  15  -KR         Manual Rx 1    Manual Rx 1 Location  --  -KR      Manual Rx 1 Type  --  -KR         Manual Rx 2    Manual Rx 2 Location  upper thoracic and CT  -KR      Manual Rx 2 Type  extenstion mobilizations, prone  -KR      Manual Rx 2 Grade  2/3 no cavitations  -KR         Manual Rx 3    Manual Rx 3 Location  lower lumbar & superior gluteals  -KR      Manual Rx 3 Type  STM w/ small black ridged roller  -KR        User Key  (r) = Recorded By, (t) = Taken By, (c) = Cosigned By    Initials Name Provider Type    Lina Taylor, PT DPT Physical Therapist          PT OP Goals     Row Name 10/28/19 0745          PT Short Term Goals    STG Date to Achieve  11/02/19  -KR        Long Term Goals    LTG Date to Achieve  12/02/19  -KR     LTG 1  Pt will score 5% or less on the modified Oswestry index.   -KR     LTG 1 Progress  Ongoing  -KR     LTG 1 Progress Comments  stayed at 12%  -KR     LTG 2  Pt will report pain no greater than 1-/2/10 with bending activities.   -KR     LTG 2 Progress  Ongoing  -KR     LTG 2 Progress Comments  up  to 7/10 Friday  -KR     LTG 3  Pt will be independent with HEP to include hip and core strengthening.   -KR     LTG 3 Progress  Ongoing  -KR     LTG 3 Progress Comments  verbalizes compliance  -KR     LTG 4  Pt will demonstrate 4/5 or greater glut med strength on RLE.  -KR     LTG 4 Progress  Ongoing  -KR     LTG 4 Progress Comments  (B) 4-/5  -KR        Time Calculation    PT Goal Re-Cert Due Date  11/27/19  -KR       User Key  (r) = Recorded By, (t) = Taken By, (c) = Cosigned By    Initials Name Provider Type    Lina Taylor, PT DPT Physical Therapist          Therapy Education  Education Details: SKC, piriformis stretch (seated and supine), TA march, bridge, quadruped LE extension, hip abduction (standing)  Given: HEP, Symptoms/condition management, Posture/body mechanics  Program: Reinforced, Progressed  How Provided: Verbal, Demonstration, Written  Provided to: Patient  Level of Understanding: Verbalized, Demonstrated    Outcome Measure Options: Modifed O[place]  Modified Oswestry  Modified Oswestry Score/Comments: 12%      Time Calculation:   Total Timed Code Minutes- PT: 44 minute(s)      PT G-Codes  Outcome Measure Options: Modifed Owestry  Modified Oswestry Score/Comments: 12%         Lina Gonzalez, PT DPT  10/28/2019

## 2019-10-30 ENCOUNTER — TREATMENT (OUTPATIENT)
Dept: PHYSICAL THERAPY | Facility: CLINIC | Age: 34
End: 2019-10-30

## 2019-10-30 DIAGNOSIS — M51.36 DDD (DEGENERATIVE DISC DISEASE), LUMBAR: ICD-10-CM

## 2019-10-30 DIAGNOSIS — M51.26 LUMBAR DISC HERNIATION: ICD-10-CM

## 2019-10-30 DIAGNOSIS — M43.06 PARS DEFECT OF LUMBAR SPINE: ICD-10-CM

## 2019-10-30 DIAGNOSIS — M54.50 CHRONIC MIDLINE LOW BACK PAIN WITHOUT SCIATICA: ICD-10-CM

## 2019-10-30 DIAGNOSIS — M43.17 SPONDYLOLISTHESIS AT L5-S1 LEVEL: Primary | ICD-10-CM

## 2019-10-30 DIAGNOSIS — G89.29 CHRONIC MIDLINE LOW BACK PAIN WITHOUT SCIATICA: ICD-10-CM

## 2019-10-30 PROCEDURE — 97110 THERAPEUTIC EXERCISES: CPT | Performed by: PHYSICAL THERAPIST

## 2019-10-30 PROCEDURE — 97140 MANUAL THERAPY 1/> REGIONS: CPT | Performed by: PHYSICAL THERAPIST

## 2019-10-30 NOTE — PROGRESS NOTES
Outpatient Physical Therapy Ortho Treatment Note       Patient Name: Beverley Silverio  : 1985  MRN: [id number]  Today's Date: 10/30/2019      Visit Date: 10/30/2019    Visit Dx:    ICD-10-CM ICD-9-CM   1. Spondylolisthesis at L5-S1 level M43.17 756.12   2. Pars defect of lumbar spine M43.06 738.4   3. Lumbar disc herniation M51.26 722.10   4. DDD (degenerative disc disease), lumbar M51.36 722.52   5. Chronic midline low back pain without sciatica M54.5 724.2    G89.29 338.29       Patient Active Problem List   Diagnosis   • Left lower quadrant pain   • Nonsmoker   • Obesity, unspecified obesity severity, unspecified obesity type   • Hyperthyroidism   • Weight gain   • Fatigue   • S/P right thyroidectomy        Past Medical History:   Diagnosis Date   • Abnormal Pap smear of cervix    • Anxiety    • Family history of thyroid cancer    • Menorrhagia with irregular cycle    • Migraine    • S/P right thyroidectomy 2019   • Thyroid nodule         Past Surgical History:   Procedure Laterality Date   •  SECTION     • D&C HYSTEROSCOPY ENDOMETRIAL ABLATION N/A 2017    Procedure: DILATATION AND CURETTAGE HYSTEROSCOPY NOVASURE ENDOMETRIAL ABLATION;  Surgeon: Jj Danielson MD;  Location: [facility] OR;  Service:    • THYROIDECTOMY Right 2019    Procedure: RIGHT THYROIDECTOMY with laryngeal nerve monitor;  Surgeon: Cyrus Gil MD;  Location: [facility] OR;  Service: ENT   • TUBAL ABDOMINAL LIGATION                         PT Assessment/Plan     Row Name 10/30/19 1543          PT Assessment    Assessment Comments  We progressed hip and core strengthening. Pt. denied pain during activity. Pt. continues to be stiff in CT joint but mid and upper thoracic spine have improved mobility.   -AF        PT Plan    PT Plan Comments  Continue to increase hip and core strength. Consider bolstering HEP.   -AF       User Key  (r) = Recorded By, (t) = Taken By, (c) = Cosigned By    Initials Name Provider Type     AF [place], Renee, PTA Physical Therapy Assistant            OP Exercises     Row Name 10/30/19 1542             Subjective Comments    Subjective Comments  Pt. reports having decreased pain since last visit.   -AF         Subjective Pain    Able to rate subjective pain?  yes  -AF      Pre-Treatment Pain Level  1  -AF      Post-Treatment Pain Level  1  -AF         Total Minutes    78320 - PT Therapeutic Exercise Minutes  33  -AF      40240 - PT Manual Therapy Minutes  12  -AF         Exercise 1    Exercise Name 1  prone scap squeezes  -AF      Cueing 1  Verbal;Tactile  -AF      Sets 1  2  -AF      Reps 1  10  -AF         Exercise 2    Exercise Name 2  prone hip ext  -AF      Cueing 2  Verbal;Tactile  -AF      Sets 2  2  -AF      Reps 2  10  -AF         Exercise 3    Exercise Name 3  quadruped cat/cow  -AF      Cueing 3  Verbal;Demo  -AF      Sets 3  2  -AF      Reps 3  10  -AF         Exercise 4    Exercise Name 4  bird dog  -AF      Cueing 4  Verbal;Demo  -AF      Sets 4  2  -AF      Reps 4  10  -AF         Exercise 5    Exercise Name 5  quadruped crossovers  -AF      Cueing 5  Verbal;Demo  -AF      Sets 5  2  -AF      Reps 5  10  -AF         Exercise 6    Exercise Name 6  single leg bridges  -AF      Cueing 6  Verbal;Demo  -AF      Sets 6  2  -AF      Reps 6  10  -AF         Exercise 7    Exercise Name 7  SL SLR w/ red can-do  -AF      Cueing 7  Verbal;Tactile  -AF      Sets 7  2  -AF      Reps 7  10  -AF         Exercise 8    Exercise Name 8  TA marches supine on half bolster  -AF      Cueing 8  Verbal;Demo  -AF      Sets 8  2  -AF      Reps 8  10  -AF         Exercise 9    Exercise Name 9  modified plank hip dips  -AF      Cueing 9  Verbal;Demo  -AF      Sets 9  2  -AF      Reps 9  10  -AF         Exercise 10    Exercise Name 10  standing lateral side bends  -AF      Cueing 10  Verbal;Demo  -AF      Sets 10  2  -AF      Reps 10  10  -AF      Additional Comments  5 lb plate in each hand  -AF        User Key   (r) = Recorded By, (t) = Taken By, (c) = Cosigned By    Initials Name Provider Type    AF [place], Renee, PTA Physical Therapy Assistant                      Manual Rx (last 36 hours)      Manual Treatments     Row Name 10/30/19 1543             Total Minutes    41682 - PT Manual Therapy Minutes  12  -AF         Manual Rx 1    Manual Rx 1 Location  lower lumbar & superior gluteals  -AF      Manual Rx 1 Type  STM w/ small black ridged roller  -AF      Manual Rx 1 Duration  6  -AF         Manual Rx 2    Manual Rx 2 Location  upper thoracic and CT  -AF      Manual Rx 2 Type  extenstion mobilizations, prone  -AF      Manual Rx 2 Duration  6  -AF        User Key  (r) = Recorded By, (t) = Taken By, (c) = Cosigned By    Initials Name Provider Type    AF [place], Renee, PTA Physical Therapy Assistant          PT OP Goals     Row Name 10/30/19 1543          PT Short Term Goals    STG Date to Achieve  11/02/19  -AF        Long Term Goals    LTG Date to Achieve  12/02/19  -AF     LTG 1  Pt will score 5% or less on the modified Oswestry index.   -AF     LTG 1 Progress  Ongoing  -AF     LTG 2  Pt will report pain no greater than 1-/2/10 with bending activities.   -AF     LTG 2 Progress  Ongoing  -AF     LTG 3  Pt will be independent with HEP to include hip and core strengthening.   -AF     LTG 3 Progress  Ongoing  -AF     LTG 4  Pt will demonstrate 4/5 or greater glut med strength on RLE.  -AF     LTG 4 Progress  Ongoing  -AF     LTG 4 Progress Comments  Progressed hip strengthening today  -AF        Time Calculation    PT Goal Re-Cert Due Date  11/27/19  -AF       User Key  (r) = Recorded By, (t) = Taken By, (c) = Cosigned By    Initials Name Provider Type    AF [place], Renee, PTA Physical Therapy Assistant          Therapy Education  Given: Posture/body mechanics, Symptoms/condition management  Program: Reinforced  How Provided: Demonstration, Verbal  Provided to: Patient  Level of Understanding: Verbalized,  Demonstrated              Time Calculation:   Total Timed Code Minutes- PT: 45 minute(s)                Renee Barraza, PTA  10/30/2019

## 2019-11-04 ENCOUNTER — PROCEDURE VISIT (OUTPATIENT)
Dept: OBSTETRICS AND GYNECOLOGY | Facility: CLINIC | Age: 34
End: 2019-11-04

## 2019-11-04 ENCOUNTER — OFFICE VISIT (OUTPATIENT)
Dept: OBSTETRICS AND GYNECOLOGY | Facility: CLINIC | Age: 34
End: 2019-11-04

## 2019-11-04 ENCOUNTER — TREATMENT (OUTPATIENT)
Dept: PHYSICAL THERAPY | Facility: CLINIC | Age: 34
End: 2019-11-04

## 2019-11-04 VITALS
BODY MASS INDEX: 37.38 KG/M2 | DIASTOLIC BLOOD PRESSURE: 70 MMHG | HEIGHT: 61 IN | WEIGHT: 198 LBS | SYSTOLIC BLOOD PRESSURE: 118 MMHG

## 2019-11-04 DIAGNOSIS — M43.06 PARS DEFECT OF LUMBAR SPINE: ICD-10-CM

## 2019-11-04 DIAGNOSIS — Z78.9 NON-SMOKER: ICD-10-CM

## 2019-11-04 DIAGNOSIS — N94.6 DYSMENORRHEA: Primary | ICD-10-CM

## 2019-11-04 DIAGNOSIS — M43.17 SPONDYLOLISTHESIS AT L5-S1 LEVEL: Primary | ICD-10-CM

## 2019-11-04 DIAGNOSIS — R10.2 PELVIC PAIN: Primary | ICD-10-CM

## 2019-11-04 DIAGNOSIS — M51.36 DDD (DEGENERATIVE DISC DISEASE), LUMBAR: ICD-10-CM

## 2019-11-04 DIAGNOSIS — M54.50 CHRONIC MIDLINE LOW BACK PAIN WITHOUT SCIATICA: ICD-10-CM

## 2019-11-04 DIAGNOSIS — M51.26 LUMBAR DISC HERNIATION: ICD-10-CM

## 2019-11-04 DIAGNOSIS — G89.29 CHRONIC MIDLINE LOW BACK PAIN WITHOUT SCIATICA: ICD-10-CM

## 2019-11-04 PROCEDURE — 97110 THERAPEUTIC EXERCISES: CPT | Performed by: PHYSICAL THERAPIST

## 2019-11-04 PROCEDURE — 99213 OFFICE O/P EST LOW 20 MIN: CPT | Performed by: OBSTETRICS & GYNECOLOGY

## 2019-11-04 PROCEDURE — 76830 TRANSVAGINAL US NON-OB: CPT | Performed by: OBSTETRICS & GYNECOLOGY

## 2019-11-04 PROCEDURE — 97140 MANUAL THERAPY 1/> REGIONS: CPT | Performed by: PHYSICAL THERAPIST

## 2019-11-04 RX ORDER — TRANEXAMIC ACID 650 MG/1
2 TABLET ORAL 3 TIMES DAILY PRN
Qty: 30 TABLET | Refills: 3 | Status: SHIPPED | OUTPATIENT
Start: 2019-11-04 | End: 2020-02-05

## 2019-11-04 NOTE — PROGRESS NOTES
Subjective   Beverley Silverio is a 34 y.o. female is here today for follow-up.    Patient presents today to follow-up on dysmenorrhea.    History of Present Illness     She had an ablation in 2017 without difficulty or complication.  The indication for this was menorrhagia.  She did not have significant dysmenorrhea at that time.  She did initially very well.  She never achieved amenorrhea, but her menstrual flow was significantly less.  She continues to have cycles every month.  They are light in terms of flow but they are not causing tremendous cramping and abdominal pain.    Ultrasound is performed.  It is reviewed.  A normal-appearing uterus without evidence of endometrial fluid collection is noted.  Ovaries are normal.    The following portions of the patient's history were reviewed and updated as appropriate: allergies, current medications, past family history, past medical history, past social history, past surgical history and problem list.    Review of Systems   Genitourinary: Positive for menstrual problem and pelvic pain. Negative for dyspareunia, vaginal bleeding, vaginal discharge and vaginal pain.   All other systems reviewed and are negative.      Objective   Physical Exam   Constitutional: She is oriented to person, place, and time. She appears well-developed and well-nourished.   HENT:   Head: Normocephalic and atraumatic.   Abdominal: Soft. Bowel sounds are normal.   Neurological: She is alert and oriented to person, place, and time.   Skin: Skin is warm and dry.   Psychiatric: She has a normal mood and affect. Her behavior is normal. Judgment and thought content normal.   Nursing note and vitals reviewed.        Assessment/Plan   Beverley was seen today for pelvic pain.    Diagnoses and all orders for this visit:    Dysmenorrhea    Non-smoker    Other orders  -     Tranexamic Acid 650 MG tablet; Take 2 tablets by mouth 3 (Three) Times a Day As Needed (Painful menstruation).        Trial of  Lysteda.  Call for concerns or questions.  Return office in 3 months.  If no better with Lysteda, consider hysterectomy.    Jj Danielson MD

## 2019-11-04 NOTE — PROGRESS NOTES
Outpatient Physical Therapy Ortho Treatment Note       Patient Name: Beverley Silverio  : 1985  MRN: [id number]  Today's Date: 2019      Visit Date: 2019    Visit Dx:    ICD-10-CM ICD-9-CM   1. Spondylolisthesis at L5-S1 level M43.17 756.12   2. Pars defect of lumbar spine M43.06 738.4   3. Lumbar disc herniation M51.26 722.10   4. DDD (degenerative disc disease), lumbar M51.36 722.52   5. Chronic midline low back pain without sciatica M54.5 724.2    G89.29 338.29       Patient Active Problem List   Diagnosis   • Left lower quadrant pain   • Nonsmoker   • Obesity, unspecified obesity severity, unspecified obesity type   • Hyperthyroidism   • Weight gain   • Fatigue   • S/P right thyroidectomy        Past Medical History:   Diagnosis Date   • Abnormal Pap smear of cervix    • Anxiety    • Family history of thyroid cancer    • Menorrhagia with irregular cycle    • Migraine    • S/P right thyroidectomy 2019   • Thyroid nodule         Past Surgical History:   Procedure Laterality Date   •  SECTION     • D&C HYSTEROSCOPY ENDOMETRIAL ABLATION N/A 2017    Procedure: DILATATION AND CURETTAGE HYSTEROSCOPY NOVASURE ENDOMETRIAL ABLATION;  Surgeon: Jj Danielson MD;  Location: [facility] OR;  Service:    • THYROIDECTOMY Right 2019    Procedure: RIGHT THYROIDECTOMY with laryngeal nerve monitor;  Surgeon: Cyrus Gil MD;  Location: [facility] OR;  Service: ENT   • TUBAL ABDOMINAL LIGATION                         PT Assessment/Plan     Row Name 19 0700          PT Assessment    Assessment Comments  She is reporting less pain and pain free over the weekend. She does still have poor posture and weak core contributing to pain with activity.   -KR        PT Plan    PT Plan Comments  we are going to decrease to once a week and continue to progress with core strengthening and monitoring symptoms. Conitnue to progress her HEP as well.   -KR       User Key  (r) = Recorded By, (t) = Taken  By, (c) = Cosigned By    Initials Name Provider Type    Lina Taylor, PT DPT Physical Therapist            OP Exercises     Row Name 11/04/19 0700             Subjective Comments    Subjective Comments  She reports her back isn't hurting, but work up yesterday morning with a crick in her neck. She reports back not hurting over the weekend either.   -KR         Subjective Pain    Able to rate subjective pain?  yes  -KR      Pre-Treatment Pain Level  0  -KR      Post-Treatment Pain Level  0  -KR      Subjective Pain Comment  3/10 in neck  -KR         Total Minutes    76045 - PT Therapeutic Exercise Minutes  30  -KR      94571 - PT Manual Therapy Minutes  10  -KR         Exercise 1    Exercise Name 1  heel slides with rotation using pink SB  -KR      Cueing 1  Verbal  -KR      Sets 1  2  -KR      Reps 1  10  -KR         Exercise 2    Exercise Name 2  deadbugs with SB  -KR      Cueing 2  Verbal  -KR      Sets 2  2  -KR      Reps 2  10 each   -KR         Exercise 3    Exercise Name 3  1/2 kneel on foam chop  -KR      Cueing 3  Verbal  -KR      Sets 3  2  -KR      Reps 3  10 each side  -KR         Exercise 4    Exercise Name 4  airplanes with TRX   -KR      Cueing 4  Verbal  -KR      Sets 4  2  -KR      Reps 4  10 each leg  -KR         Exercise 5    Exercise Name 5  TA marches supine on half bolster  -KR      Cueing 5  Verbal  -KR      Sets 5  2  -KR      Reps 5  20  -KR        User Key  (r) = Recorded By, (t) = Taken By, (c) = Cosigned By    Initials Name Provider Type    Lina Taylor, PT DPT Physical Therapist                      Manual Rx (last 36 hours)      Manual Treatments     Row Name 11/04/19 0700             Total Minutes    60692 - PT Manual Therapy Minutes  10  -KR         Manual Rx 1    Manual Rx 1 Location  lower lumbar & superior gluteals  -KR      Manual Rx 1 Type  STM w/ bluge rigded roller  -KR      Manual Rx 1 Duration  --  -KR         Manual Rx 2    Manual Rx 2 Location  upper  thoracic and CT  -KR      Manual Rx 2 Type  extenstion mobilizations, prone  -KR      Manual Rx 2 Grade  2/3 no cavitations  -KR      Manual Rx 2 Duration  --  -KR        User Key  (r) = Recorded By, (t) = Taken By, (c) = Cosigned By    Initials Name Provider Type    Lina Taylor, PT DPT Physical Therapist          PT OP Goals     Row Name 11/04/19 0700          PT Short Term Goals    STG Date to Achieve  11/02/19  -KR        Long Term Goals    LTG Date to Achieve  12/02/19  -KR     LTG 1  Pt will score 5% or less on the modified Oswestry index.   -KR     LTG 1 Progress  Ongoing  -KR     LTG 2  Pt will report pain no greater than 1-/2/10 with bending activities.   -KR     LTG 2 Progress  Ongoing  -KR     LTG 2 Progress Comments  0/10 today  -KR     LTG 3  Pt will be independent with HEP to include hip and core strengthening.   -KR     LTG 3 Progress  Ongoing  -KR     LTG 4  Pt will demonstrate 4/5 or greater glut med strength on RLE.  -KR     LTG 4 Progress  Ongoing  -KR        Time Calculation    PT Goal Re-Cert Due Date  11/27/19  -KR       User Key  (r) = Recorded By, (t) = Taken By, (c) = Cosigned By    Initials Name Provider Type    Lina Taylor PT DPT Physical Therapist          Therapy Education  Given: HEP, Symptoms/condition management, Posture/body mechanics  Program: Reinforced  How Provided: Verbal  Provided to: Patient  Level of Understanding: Verbalized              Time Calculation:   Total Timed Code Minutes- PT: 40 minute(s)                Lina Gonzalez, PT DPT  11/4/2019

## 2019-11-12 ENCOUNTER — TREATMENT (OUTPATIENT)
Dept: PHYSICAL THERAPY | Facility: CLINIC | Age: 34
End: 2019-11-12

## 2019-11-12 DIAGNOSIS — M51.36 DDD (DEGENERATIVE DISC DISEASE), LUMBAR: ICD-10-CM

## 2019-11-12 DIAGNOSIS — M54.50 CHRONIC MIDLINE LOW BACK PAIN WITHOUT SCIATICA: Primary | ICD-10-CM

## 2019-11-12 DIAGNOSIS — G89.29 CHRONIC MIDLINE LOW BACK PAIN WITHOUT SCIATICA: Primary | ICD-10-CM

## 2019-11-12 DIAGNOSIS — M51.26 LUMBAR DISC HERNIATION: ICD-10-CM

## 2019-11-12 PROCEDURE — 97110 THERAPEUTIC EXERCISES: CPT | Performed by: PHYSICAL THERAPIST

## 2019-11-12 PROCEDURE — 97140 MANUAL THERAPY 1/> REGIONS: CPT | Performed by: PHYSICAL THERAPIST

## 2019-11-12 NOTE — PROGRESS NOTES
Outpatient Physical Therapy Ortho Treatment Note       Patient Name: Beverley Silverio  : 1985  MRN: [id number]  Today's Date: 2019      Visit Date: 2019    Visit Dx:    ICD-10-CM ICD-9-CM   1. Chronic midline low back pain without sciatica M54.5 724.2    G89.29 338.29   2. DDD (degenerative disc disease), lumbar M51.36 722.52   3. Lumbar disc herniation M51.26 722.10       Patient Active Problem List   Diagnosis   • Left lower quadrant pain   • Nonsmoker   • Obesity, unspecified obesity severity, unspecified obesity type   • Hyperthyroidism   • Weight gain   • Fatigue   • S/P right thyroidectomy        Past Medical History:   Diagnosis Date   • Abnormal Pap smear of cervix    • Anxiety    • Family history of thyroid cancer    • Menorrhagia with irregular cycle    • Migraine    • S/P right thyroidectomy 2019   • Thyroid nodule         Past Surgical History:   Procedure Laterality Date   •  SECTION     • D&C HYSTEROSCOPY ENDOMETRIAL ABLATION N/A 2017    Procedure: DILATATION AND CURETTAGE HYSTEROSCOPY NOVASURE ENDOMETRIAL ABLATION;  Surgeon: Jj Danielson MD;  Location: [facility] OR;  Service:    • THYROIDECTOMY Right 2019    Procedure: RIGHT THYROIDECTOMY with laryngeal nerve monitor;  Surgeon: Cyrus Gil MD;  Location: [facility] OR;  Service: ENT   • TUBAL ABDOMINAL LIGATION                 Lymphedema     Row Name 19 3932             Subjective Pain    Post-Treatment Pain Level  2  -AL         Subjective Comments    Subjective Comments  She bent down Thursday at work and couldn't get back up.  She was like this for about 3 min, her pain was an 8/10.  She went to the chiropractor and this helped.  She was not able to move around much, she rested a lot over the weekend.  Today is the first day she can move.    -AL        User Key  (r) = Recorded By, (t) = Taken By, (c) = Cosigned By    Initials Name Provider Type    AL Evelin Gaviria PTA, SUSI-KYREE Physical  Therapy Assistant              PT Assessment/Plan     Row Name 11/12/19 1530          PT Assessment    Assessment Comments  She had increae pain with forward bending at work last week which took her several days to recover.  She was able to do the exercises today without and increase in pain.  Will plan to work on more core exercises next visit.  She did have a decrease in pain after today's visit  -AL        PT Plan    PT Plan Comments  Will continue to work on core strengthening, as well as decreasing low back pain.   -AL       User Key  (r) = Recorded By, (t) = Taken By, (c) = Cosigned By    Initials Name Provider Type    AL Evelin Gaviria, PTA, SAMANTHAT-KYREE Physical Therapy Assistant            OP Exercises     Row Name 11/12/19 1530             Subjective Comments    Subjective Comments  She bent down Thursday at work and couldn't get back up.  She was like this for about 3 min, her pain was an 8/10.  She went to the chiropractor and this helped.  She was not able to move around much, she rested a lot over the weekend.  Today is the first day she can move.    -AL         Subjective Pain    Able to rate subjective pain?  yes  -AL      Pre-Treatment Pain Level  4  -AL      Post-Treatment Pain Level  2  -AL      Subjective Pain Comment  low back more on the R side  -AL         Total Minutes    27028 - PT Therapeutic Exercise Minutes  30  -AL      42138 - PT Manual Therapy Minutes  15  -AL         Exercise 1    Exercise Name 1  TA contraction  -AL      Cueing 1  Verbal  -AL      Sets 1  2  -AL      Reps 1  10  -AL         Exercise 2    Exercise Name 2  PPT  -AL      Cueing 2  Verbal  -AL      Sets 2  2  -AL      Reps 2  10  -AL         Exercise 3    Exercise Name 3  TA marches   -AL      Cueing 3  Verbal  -AL      Sets 3  2  -AL      Reps 3  10  -AL         Exercise 4    Exercise Name 4  bridges  -AL      Cueing 4  Verbal  -AL      Sets 4  2  -AL      Reps 4  10  -AL        User Key  (r) = Recorded By, (t) = Taken By,  (c) = Cosigned By    Initials Name Provider Type    Evelin Moyer, CAROLANN, SUSI-KYREE Physical Therapy Assistant                      Manual Rx (last 36 hours)      Manual Treatments     Row Name 11/12/19 1530             Total Minutes    51826 - PT Manual Therapy Minutes  15  -AL         Manual Rx 1    Manual Rx 1 Location  lumbar spine  -AL      Manual Rx 1 Type  STM  -AL      Manual Rx 1 Grade  min to mod  -AL      Manual Rx 1 Duration  15  -AL        User Key  (r) = Recorded By, (t) = Taken By, (c) = Cosigned By    Initials Name Provider Type    Evelin Moyer, PTA, CLT-KYREE Physical Therapy Assistant          PT OP Goals     Row Name 11/12/19 1530          PT Short Term Goals    STG Date to Achieve  11/02/19  -AL        Long Term Goals    LTG Date to Achieve  12/02/19  -AL     LTG 1  Pt will score 5% or less on the modified Oswestry index.   -AL     LTG 1 Progress  Ongoing  -AL     LTG 2  Pt will report pain no greater than 1-/2/10 with bending activities.   -AL     LTG 2 Progress  Ongoing  -AL     LTG 2 Progress Comments  Jeimy had pain 8/10 with bending at work  -AL     LTG 3  Pt will be independent with HEP to include hip and core strengthening.   -AL     LTG 3 Progress  Ongoing  -AL     LTG 3 Progress Comments  She is working on the HEP  -AL     LTG 4  Pt will demonstrate 4/5 or greater glut med strength on RLE.  -AL     LTG 4 Progress  Ongoing  -AL        Time Calculation    PT Goal Re-Cert Due Date  11/27/19  -AL       User Key  (r) = Recorded By, (t) = Taken By, (c) = Cosigned By    Initials Name Provider Type    Evelin Moyer PTA, SUSI-KYREE Physical Therapy Assistant          Therapy Education  Education Details: HEP, work on exercises that do not cause increase pain  Given: HEP  Program: Reinforced  How Provided: Verbal  Provided to: Patient  Level of Understanding: Verbalized              Time Calculation:                    Evelin Gaviria PTA, CLT-LANA  11/12/2019

## 2019-11-18 ENCOUNTER — TREATMENT (OUTPATIENT)
Dept: PHYSICAL THERAPY | Facility: CLINIC | Age: 34
End: 2019-11-18

## 2019-11-18 DIAGNOSIS — M43.17 SPONDYLOLISTHESIS AT L5-S1 LEVEL: ICD-10-CM

## 2019-11-18 DIAGNOSIS — G89.29 CHRONIC MIDLINE LOW BACK PAIN WITHOUT SCIATICA: Primary | ICD-10-CM

## 2019-11-18 DIAGNOSIS — M43.06 PARS DEFECT OF LUMBAR SPINE: ICD-10-CM

## 2019-11-18 DIAGNOSIS — M51.26 LUMBAR DISC HERNIATION: ICD-10-CM

## 2019-11-18 DIAGNOSIS — M51.36 DDD (DEGENERATIVE DISC DISEASE), LUMBAR: ICD-10-CM

## 2019-11-18 DIAGNOSIS — M54.50 CHRONIC MIDLINE LOW BACK PAIN WITHOUT SCIATICA: Primary | ICD-10-CM

## 2019-11-18 PROCEDURE — 97140 MANUAL THERAPY 1/> REGIONS: CPT | Performed by: PHYSICAL THERAPIST

## 2019-11-18 PROCEDURE — 97110 THERAPEUTIC EXERCISES: CPT | Performed by: PHYSICAL THERAPIST

## 2019-11-18 NOTE — PROGRESS NOTES
Outpatient Physical Therapy Ortho Treatment Note       Patient Name: Beverley Silverio  : 1985  MRN: [id number]  Today's Date: 2019      Visit Date: 2019    Visit Dx:    ICD-10-CM ICD-9-CM   1. Chronic midline low back pain without sciatica M54.5 724.2    G89.29 338.29   2. DDD (degenerative disc disease), lumbar M51.36 722.52   3. Lumbar disc herniation M51.26 722.10   4. Spondylolisthesis at L5-S1 level M43.17 756.12   5. Pars defect of lumbar spine M43.06 738.4       Patient Active Problem List   Diagnosis   • Left lower quadrant pain   • Nonsmoker   • Obesity, unspecified obesity severity, unspecified obesity type   • Hyperthyroidism   • Weight gain   • Fatigue   • S/P right thyroidectomy        Past Medical History:   Diagnosis Date   • Abnormal Pap smear of cervix    • Anxiety    • Family history of thyroid cancer    • Menorrhagia with irregular cycle    • Migraine    • S/P right thyroidectomy 2019   • Thyroid nodule         Past Surgical History:   Procedure Laterality Date   •  SECTION     • D&C HYSTEROSCOPY ENDOMETRIAL ABLATION N/A 2017    Procedure: DILATATION AND CURETTAGE HYSTEROSCOPY NOVASURE ENDOMETRIAL ABLATION;  Surgeon: Jj Danielson MD;  Location: [facility] OR;  Service:    • THYROIDECTOMY Right 2019    Procedure: RIGHT THYROIDECTOMY with laryngeal nerve monitor;  Surgeon: Cyrus Gil MD;  Location: [facility] OR;  Service: ENT   • TUBAL ABDOMINAL LIGATION                         PT Assessment/Plan     Row Name 19 0742          PT Assessment    Assessment Comments  Her pain has improved from last week, so we tried to progress with core and hip strengthening today. Her pain has still not returned to as low as it was prior to acute flare last week. She does still have core and hip weakness contributing to increased strain on her back.   -KR        PT Plan    PT Plan Comments  Progress note next visit.Conitnue to work on decreasing pain and  progressing hip/core strength, as well as HEP.   (Significant)   -KR       User Key  (r) = Recorded By, (t) = Taken By, (c) = Cosigned By    Initials Name Provider Type    Lina Taylor, GIRISH DPT Physical Therapist            OP Exercises     Row Name 11/18/19 0742             Subjective Comments    Subjective Comments  She reports the pain comes/goes. She does feel tightness in back when she bends forward.   -KR         Subjective Pain    Able to rate subjective pain?  yes  -KR      Pre-Treatment Pain Level  3  -KR         Total Minutes    97769 - PT Therapeutic Exercise Minutes  30  -KR      00739 - PT Manual Therapy Minutes  15  -KR         Exercise 1    Exercise Name 1  quadruped hip extension  -KR      Cueing 1  Verbal  -KR      Sets 1  2  -KR      Reps 1  10  -KR         Exercise 2    Exercise Name 2  side bridges  -KR      Cueing 2  Verbal  -KR      Sets 2  2  -KR      Reps 2  10 each side  -KR         Exercise 3    Exercise Name 3  bridge on SB  -KR      Cueing 3  Verbal  -KR      Sets 3  3  -KR      Reps 3  10  -KR         Exercise 4    Exercise Name 4  (B) heel slides on SB  -KR      Cueing 4  Verbal  -KR      Sets 4  3  -KR      Reps 4  10  -KR         Exercise 5    Exercise Name 5  side steps with red TB   -KR      Cueing 5  Verbal  -KR      Sets 5  3  -KR      Reps 5  8 steps each direction  -KR         Exercise 6    Exercise Name 6  standing hip abduction with red TB  -KR      Cueing 6  Verbal  -KR      Sets 6  2  -KR      Reps 6  10 each   -KR         Exercise 7    Exercise Name 7  pot stirs on SB   -KR      Cueing 7  Verbal  -KR      Sets 7  3  -KR      Reps 7  10 each direction  -KR         Exercise 8    Exercise Name 8  seated ball lifts (4#) and marches  -KR      Cueing 8  Verbal  -KR      Sets 8  2  -KR      Reps 8  10 each leg  -KR        User Key  (r) = Recorded By, (t) = Taken By, (c) = Cosigned By    Initials Name Provider Type    Lina Taylor PT DPT Physical Therapist                       Manual Rx (last 36 hours)      Manual Treatments     Row Name 11/18/19 0742             Total Minutes    26341 - PT Manual Therapy Minutes  15  -KR         Manual Rx 1    Manual Rx 1 Location  lower lumbar & superior gluteals  -KR      Manual Rx 1 Type  STM w/ bluge rigded roller  -KR         Manual Rx 2    Manual Rx 2 Location  upper thoracic and CT  -KR      Manual Rx 2 Type  extenstion mobilizations, prone  -KR      Manual Rx 2 Grade  2/3 no cavitations  -KR        User Key  (r) = Recorded By, (t) = Taken By, (c) = Cosigned By    Initials Name Provider Type    Lina Taylor, PT DPT Physical Therapist          PT OP Goals     Row Name 11/18/19 0742          PT Short Term Goals    STG Date to Achieve  11/02/19  -KR        Long Term Goals    LTG Date to Achieve  12/02/19  -KR     LTG 1  Pt will score 5% or less on the modified Oswestry index.   -KR     LTG 1 Progress  Ongoing  -KR     LTG 2  Pt will report pain no greater than 1-/2/10 with bending activities.   -KR     LTG 2 Progress  Ongoing  -KR     LTG 2 Progress Comments  3-5/10  -KR     LTG 3  Pt will be independent with HEP to include hip and core strengthening.   -KR     LTG 3 Progress  Ongoing  -KR     LTG 4  Pt will demonstrate 4/5 or greater glut med strength on RLE.  -KR     LTG 4 Progress  Ongoing  -KR        Time Calculation    PT Goal Re-Cert Due Date  11/27/19  -KR       User Key  (r) = Recorded By, (t) = Taken By, (c) = Cosigned By    Initials Name Provider Type    Lina Taylor PT DPT Physical Therapist          Therapy Education  Given: HEP, Symptoms/condition management, Posture/body mechanics  Program: Reinforced  How Provided: Verbal  Provided to: Patient  Level of Understanding: Verbalized              Time Calculation:   Total Timed Code Minutes- PT: 45 minute(s)                Lina Gonzalez PT DPT  11/18/2019

## 2019-12-02 ENCOUNTER — TREATMENT (OUTPATIENT)
Dept: PHYSICAL THERAPY | Facility: CLINIC | Age: 34
End: 2019-12-02

## 2019-12-02 DIAGNOSIS — M43.17 SPONDYLOLISTHESIS AT L5-S1 LEVEL: ICD-10-CM

## 2019-12-02 DIAGNOSIS — M43.06 PARS DEFECT OF LUMBAR SPINE: ICD-10-CM

## 2019-12-02 DIAGNOSIS — G89.29 CHRONIC MIDLINE LOW BACK PAIN WITHOUT SCIATICA: Primary | ICD-10-CM

## 2019-12-02 DIAGNOSIS — M51.26 LUMBAR DISC HERNIATION: ICD-10-CM

## 2019-12-02 DIAGNOSIS — M54.50 CHRONIC MIDLINE LOW BACK PAIN WITHOUT SCIATICA: Primary | ICD-10-CM

## 2019-12-02 DIAGNOSIS — M51.36 DDD (DEGENERATIVE DISC DISEASE), LUMBAR: ICD-10-CM

## 2019-12-02 PROCEDURE — 97530 THERAPEUTIC ACTIVITIES: CPT | Performed by: PHYSICAL THERAPIST

## 2019-12-02 PROCEDURE — 97110 THERAPEUTIC EXERCISES: CPT | Performed by: PHYSICAL THERAPIST

## 2019-12-02 NOTE — PROGRESS NOTES
Outpatient Physical Therapy Ortho Progress Note/Discharge Summary       Patient Name: Beverley Silverio  : 1985  MRN: [id number]  Today's Date: 2019      Visit Date: 2019    Visit Dx:    ICD-10-CM ICD-9-CM   1. Chronic midline low back pain without sciatica M54.5 724.2    G89.29 338.29   2. DDD (degenerative disc disease), lumbar M51.36 722.52   3. Lumbar disc herniation M51.26 722.10   4. Spondylolisthesis at L5-S1 level M43.17 756.12   5. Pars defect of lumbar spine M43.06 738.4       Patient Active Problem List   Diagnosis   • Left lower quadrant pain   • Nonsmoker   • Obesity, unspecified obesity severity, unspecified obesity type   • Hyperthyroidism   • Weight gain   • Fatigue   • S/P right thyroidectomy        Past Medical History:   Diagnosis Date   • Abnormal Pap smear of cervix    • Anxiety    • Family history of thyroid cancer    • Menorrhagia with irregular cycle    • Migraine    • S/P right thyroidectomy 2019   • Thyroid nodule         Past Surgical History:   Procedure Laterality Date   •  SECTION     • D&C HYSTEROSCOPY ENDOMETRIAL ABLATION N/A 2017    Procedure: DILATATION AND CURETTAGE HYSTEROSCOPY NOVASURE ENDOMETRIAL ABLATION;  Surgeon: Jj Danielson MD;  Location: [facility] OR;  Service:    • THYROIDECTOMY Right 2019    Procedure: RIGHT THYROIDECTOMY with laryngeal nerve monitor;  Surgeon: Cyrus Gil MD;  Location: [facility] OR;  Service: ENT   • TUBAL ABDOMINAL LIGATION                         PT Assessment/Plan     Row Name 19 0745          PT Assessment    Assessment Comments  Patient is painfree at this point and met all goals. She has showed improved core and hip stability. She has an updated HEP to conitnue to progress from home. She is to call with any questions or concerns.   -KR        PT Plan    PT Plan Comments  discharge.   -KR       User Key  (r) = Recorded By, (t) = Taken By, (c) = Cosigned By    Initials Name Provider Type     Lina Taylor, PT DPT Physical Therapist              OP Exercises     Row Name 12/02/19 0745             Subjective Comments    Subjective Comments  She reports no pain and doing everything she wants to do. She reports no difficulty with any activity.   -KR         Subjective Pain    Able to rate subjective pain?  yes  -KR      Pre-Treatment Pain Level  0  -KR      Post-Treatment Pain Level  0  -KR         Total Minutes    01361 - PT Therapeutic Exercise Minutes  30  -KR      04244 - PT Therapeutic Activity Minutes  10  -KR         Exercise 1    Exercise Name 1  firehydrants/hip extension in quadruped  -KR      Cueing 1  Verbal  -KR      Sets 1  2  -KR      Reps 1  10 each leg  -KR         Exercise 2    Exercise Name 2  side plank with clam  -KR      Cueing 2  Verbal  -KR      Sets 2  2  -KR      Reps 2  10 each side  -KR         Exercise 3    Exercise Name 3  side steps with green TB  -KR      Cueing 3  Verbal  -KR      Sets 3  3  -KR      Reps 3  10 each direction  -KR         Exercise 4    Exercise Name 4  airplanes with TRX strap  -KR      Cueing 4  Verbal  -KR      Sets 4  2  -KR      Reps 4  10 each leg  -KR         Exercise 5    Exercise Name 5  box lift floor to waist carry 40 feet  -KR      Cueing 5  Verbal  -KR      Reps 5  2 laps  -KR      Additional Comments  15#  -KR         Exercise 6    Exercise Name 6  push/pull sled  -KR      Cueing 6  Verbal  -KR      Reps 6  3 laps  -KR      Additional Comments  10#  -KR         Exercise 7    Exercise Name 7  bridge on SB  -KR      Cueing 7  Verbal  -KR      Sets 7  3  -KR      Reps 7  15  -KR         Exercise 8    Exercise Name 8  deadbugs with SB  -KR      Cueing 8  Verbal  -KR      Sets 8  3  -KR      Reps 8  10 each side  -KR        User Key  (r) = Recorded By, (t) = Taken By, (c) = Cosigned By    Initials Name Provider Type    Lina Taylor, PT DPT Physical Therapist                         PT OP Goals     Row Name 12/02/19 8645           PT Short Term Goals    STG Date to Achieve  12/16/19  -KR        Long Term Goals    LTG Date to Achieve  01/01/20  -KR     LTG 1  Pt will score 5% or less on the modified Oswestry index.   -KR     LTG 1 Progress  Met  -KR     LTG 1 Progress Comments  2%  -KR     LTG 2  Pt will report pain no greater than 1-/2/10 with bending activities.   -KR     LTG 2 Progress  Met  -KR     LTG 2 Progress Comments  0/10  -KR     LTG 3  Pt will be independent with HEP to include hip and core strengthening.   -KR     LTG 3 Progress  Met  -KR     LTG 4  Pt will demonstrate 4/5 or greater glut med strength on RLE.  -KR     LTG 4 Progress  Met  -KR     LTG 4 Progress Comments  4+/5  -KR        Time Calculation    PT Goal Re-Cert Due Date  01/01/20  -KR       User Key  (r) = Recorded By, (t) = Taken By, (c) = Cosigned By    Initials Name Provider Type    Lina Taylor, PT DPT Physical Therapist          Therapy Education  Given: HEP, Symptoms/condition management, Posture/body mechanics  Program: Reinforced  How Provided: Verbal  Provided to: Patient  Level of Understanding: Demonstrated    Outcome Measure Options: Modifed Owestry  Modified Oswestry  Modified Oswestry Score/Comments: 2%      Time Calculation:          PT G-Codes  Outcome Measure Options: Modifed Owestry  Modified Oswestry Score/Comments: 2%     OP PT Discharge Summary  Date of Discharge: 12/02/19  Reason for Discharge: All goals achieved, Independent  Outcomes Achieved: Able to achieve all goals within established timeline  Discharge Destination: Home with home program      Lina Gonzalez, PT DPT  12/2/2019

## 2019-12-18 ENCOUNTER — OFFICE VISIT (OUTPATIENT)
Dept: OBSTETRICS AND GYNECOLOGY | Facility: CLINIC | Age: 34
End: 2019-12-18

## 2019-12-18 VITALS
SYSTOLIC BLOOD PRESSURE: 128 MMHG | BODY MASS INDEX: 38.33 KG/M2 | DIASTOLIC BLOOD PRESSURE: 68 MMHG | HEIGHT: 61 IN | WEIGHT: 203 LBS

## 2019-12-18 DIAGNOSIS — N94.6 DYSMENORRHEA: Primary | ICD-10-CM

## 2019-12-18 DIAGNOSIS — Z78.9 NON-SMOKER: ICD-10-CM

## 2019-12-18 PROCEDURE — 99213 OFFICE O/P EST LOW 20 MIN: CPT | Performed by: OBSTETRICS & GYNECOLOGY

## 2019-12-18 RX ORDER — SODIUM CHLORIDE 0.9 % (FLUSH) 0.9 %
10 SYRINGE (ML) INJECTION AS NEEDED
Status: CANCELLED | OUTPATIENT
Start: 2019-12-18

## 2019-12-18 RX ORDER — PHENAZOPYRIDINE HYDROCHLORIDE 100 MG/1
200 TABLET, FILM COATED ORAL ONCE
Status: CANCELLED | OUTPATIENT
Start: 2019-12-18 | End: 2019-12-18

## 2019-12-18 RX ORDER — SODIUM CHLORIDE, SODIUM LACTATE, POTASSIUM CHLORIDE, CALCIUM CHLORIDE 600; 310; 30; 20 MG/100ML; MG/100ML; MG/100ML; MG/100ML
125 INJECTION, SOLUTION INTRAVENOUS CONTINUOUS
Status: CANCELLED | OUTPATIENT
Start: 2019-12-18

## 2019-12-18 RX ORDER — SODIUM CHLORIDE 0.9 % (FLUSH) 0.9 %
3 SYRINGE (ML) INJECTION EVERY 12 HOURS SCHEDULED
Status: CANCELLED | OUTPATIENT
Start: 2019-12-18

## 2019-12-18 NOTE — PROGRESS NOTES
Subjective   Beverley Silverio is a 34 y.o. female is here today for follow-up.    Patient presents today to follow-up on dysmenorrhea.    History of Present Illness     Previous office notes, operative summaries, pathology, and ultrasounds are reviewed.  3 years ago she had an endometrial ablation for menorrhagia.  Her vaginal bleeding is much less.  However, since that time, she has a new complaint of dysmenorrhea.  She has tried 1 cycle of Lysteda without improvement.  She is interested in definitive therapy.  Recent Pap is normal.  Recent ultrasound is unremarkable.    The following portions of the patient's history were reviewed and updated as appropriate: allergies, current medications, past family history, past medical history, past social history, past surgical history and problem list.    Review of Systems   Genitourinary: Positive for menstrual problem and pelvic pain.   All other systems reviewed and are negative.      Objective   Physical Exam   Constitutional: She is oriented to person, place, and time. She appears well-developed and well-nourished.   HENT:   Head: Normocephalic and atraumatic.   Abdominal: Soft. Bowel sounds are normal.   Neurological: She is alert and oriented to person, place, and time.   Skin: Skin is warm and dry.   Psychiatric: She has a normal mood and affect. Her behavior is normal. Judgment and thought content normal.   Nursing note and vitals reviewed.        Assessment/Plan   Beverley was seen today for follow-up.    Diagnoses and all orders for this visit:    Dysmenorrhea  -     Case Request  -     CBC and Differential; Future    Non-smoker    Other orders  -     Follow Anesthesia Guidelines / Standing Orders; Future  -     Chlorhexidine Skin Prep; Future        Patient would like to wait a few months before proceeding.  Pamphlet on da Jose hysterectomy provided.  Case request and preop orders are entered.  We will continue Lysteda in the meantime to see if we get further  improvement.  Otherwise, return office for preop appointment in February.  Patient encouraged to call with concerns or questions.    Jj Danielson MD

## 2019-12-23 ENCOUNTER — RESULTS ENCOUNTER (OUTPATIENT)
Dept: OBSTETRICS AND GYNECOLOGY | Facility: CLINIC | Age: 34
End: 2019-12-23

## 2019-12-23 DIAGNOSIS — N94.6 DYSMENORRHEA: ICD-10-CM

## 2019-12-26 ENCOUNTER — TELEPHONE (OUTPATIENT)
Dept: OBSTETRICS AND GYNECOLOGY | Facility: CLINIC | Age: 34
End: 2019-12-26

## 2019-12-26 NOTE — TELEPHONE ENCOUNTER
Pt informed that her surgery on 2/21/19 with Dr Danielson is moved to 7am instead of 745a. She is to arrive at 515a. Voiced understanding.

## 2020-02-05 ENCOUNTER — APPOINTMENT (OUTPATIENT)
Dept: PREADMISSION TESTING | Facility: HOSPITAL | Age: 35
End: 2020-02-05

## 2020-02-05 ENCOUNTER — OFFICE VISIT (OUTPATIENT)
Dept: OBSTETRICS AND GYNECOLOGY | Facility: CLINIC | Age: 35
End: 2020-02-05

## 2020-02-05 VITALS
BODY MASS INDEX: 39.46 KG/M2 | OXYGEN SATURATION: 97 % | HEART RATE: 93 BPM | DIASTOLIC BLOOD PRESSURE: 72 MMHG | WEIGHT: 209 LBS | HEIGHT: 61 IN | SYSTOLIC BLOOD PRESSURE: 124 MMHG

## 2020-02-05 VITALS
BODY MASS INDEX: 38.89 KG/M2 | DIASTOLIC BLOOD PRESSURE: 80 MMHG | SYSTOLIC BLOOD PRESSURE: 110 MMHG | HEIGHT: 61 IN | WEIGHT: 206 LBS

## 2020-02-05 DIAGNOSIS — Z78.9 NON-SMOKER: ICD-10-CM

## 2020-02-05 DIAGNOSIS — N94.6 DYSMENORRHEA: Primary | ICD-10-CM

## 2020-02-05 LAB
BASOPHILS # BLD AUTO: 0.06 10*3/MM3 (ref 0–0.2)
BASOPHILS NFR BLD AUTO: 0.6 % (ref 0–1.5)
DEPRECATED RDW RBC AUTO: 41.1 FL (ref 37–54)
EOSINOPHIL # BLD AUTO: 0.09 10*3/MM3 (ref 0–0.4)
EOSINOPHIL NFR BLD AUTO: 0.8 % (ref 0.3–6.2)
ERYTHROCYTE [DISTWIDTH] IN BLOOD BY AUTOMATED COUNT: 12.7 % (ref 12.3–15.4)
HCT VFR BLD AUTO: 43.4 % (ref 34–46.6)
HGB BLD-MCNC: 14.2 G/DL (ref 12–15.9)
IMM GRANULOCYTES # BLD AUTO: 0.03 10*3/MM3 (ref 0–0.05)
IMM GRANULOCYTES NFR BLD AUTO: 0.3 % (ref 0–0.5)
LYMPHOCYTES # BLD AUTO: 2.41 10*3/MM3 (ref 0.7–3.1)
LYMPHOCYTES NFR BLD AUTO: 22.6 % (ref 19.6–45.3)
MCH RBC QN AUTO: 29 PG (ref 26.6–33)
MCHC RBC AUTO-ENTMCNC: 32.7 G/DL (ref 31.5–35.7)
MCV RBC AUTO: 88.8 FL (ref 79–97)
MONOCYTES # BLD AUTO: 0.69 10*3/MM3 (ref 0.1–0.9)
MONOCYTES NFR BLD AUTO: 6.5 % (ref 5–12)
NEUTROPHILS # BLD AUTO: 7.38 10*3/MM3 (ref 1.7–7)
NEUTROPHILS NFR BLD AUTO: 69.2 % (ref 42.7–76)
NRBC BLD AUTO-RTO: 0 /100 WBC (ref 0–0.2)
PLATELET # BLD AUTO: 344 10*3/MM3 (ref 140–450)
PMV BLD AUTO: 10.3 FL (ref 6–12)
RBC # BLD AUTO: 4.89 10*6/MM3 (ref 3.77–5.28)
WBC NRBC COR # BLD: 10.66 10*3/MM3 (ref 3.4–10.8)

## 2020-02-05 PROCEDURE — 85025 COMPLETE CBC W/AUTO DIFF WBC: CPT | Performed by: OBSTETRICS & GYNECOLOGY

## 2020-02-05 PROCEDURE — 99214 OFFICE O/P EST MOD 30 MIN: CPT | Performed by: OBSTETRICS & GYNECOLOGY

## 2020-02-05 PROCEDURE — 36415 COLL VENOUS BLD VENIPUNCTURE: CPT | Performed by: OBSTETRICS & GYNECOLOGY

## 2020-02-05 NOTE — DISCHARGE INSTRUCTIONS
DAY OF SURGERY INSTRUCTIONS        YOUR SURGEON: ***Dr. Danielson    PROCEDURE: ***total laparoscopic hysterectomy with Davinci Robot with bilateral salpingectomy     DATE OF SURGERY: ***2/21/2020    ARRIVAL TIME: AS DIRECTED BY OFFICE    YOU MAY TAKE THE FOLLOWING MEDICATION(S) THE MORNING OF SURGERY WITH A SIP OF WATER: ***as directed by your doctor      ALL OTHER HOME MEDICATION CHECK WITH YOUR PHYSICIAN                MANAGING PAIN AFTER SURGERY    We know you are probably wondering what your pain will be like after surgery.  Following surgery it is unrealistic to expect you will not have pain.   Pain is how our bodies let us know that something is wrong or cautions us to be careful.  That said, our goal is to make your pain tolerable.    Methods we may use to treat your pain include (oral or IV medications, PCAs, epidurals, nerve blocks, etc.)   While some procedures require IV pain medications for a short time after surgery, transitioning to pain medications by mouth allows for better management of pain.   Your nurse will encourage you to take oral pain medications whenever possible.  IV medications work almost immediately, but only last a short while.  Taking medications by mouth allows for a more constant level of medication in your blood stream for a longer period of time.      Once your pain is out of control it is harder to get back under control.  It is important you are aware when your next dose of pain medication is due.  If you are admitted, your nurse may write the time of your next dose on the white board in your room to help you remember.      We are interested in your pain and encourage you to inform us about aggravating factors during your visit.   Many times a simple repositioning every few hours can make a big difference.    If your physician says it is okay, do not let your pain prevent you from getting out of bed. Be sure to call your nurse for assistance prior to getting up so you do not fall.       Before surgery, please decide your tolerable pain goal.  These faces help describe the pain ratings we use on a 0-10 scale.   Be prepared to tell us your goal and whether or not you take pain or anxiety medications at home.          BEFORE YOU COME TO THE HOSPITAL  (Pre-op instructions)  • Do not eat, drink, smoke or chew gum after midnight the night before surgery.  This also includes no mints.  • Morning of surgery take only the medicines you have been instructed with a sip of water unless otherwise instructed  by your physician.  • Do not shave, wear makeup or dark nail polish.  • Remove all jewelry including rings.  • Leave anything you consider valuable at home.  • Leave your suitcase in the car until after your surgery.  • Bring the following with you if applicable:  o Picture ID and insurance, Medicare or Medicaid cards  o Co-pay/deductible required by insurance (cash, check, credit card)  o Copy of advance directive, living will or power-of- documents if not brought to PAT  o CPAP or BIPAP mask and tubing  o Relaxation aids ( book, magazine), etc.  o Hearing aids                        ON THE DAY OF SURGERY  · On the day of surgery check in at registration located at the main entrance of the hospital.   ? You will be registered and given a beeper with instructions where to wait in the main lobby.  ? When your beeper lights up and vibrates a member of the Outpatient Surgery staff will meet you at the double doors under the stair steps and escort you to your preoperative room.   · You may have cloth compression devices placed on your legs. These help to prevent blood clots and reduce swelling in your legs.  · An IV may be inserted into one of your veins.  · In the operating room, you may be given one or more of the following:  ? A medicine to help you relax (sedative).  ? A medicine to numb the area (local anesthetic).  ? A medicine to make you fall asleep (general anesthetic).  ? A medicine that  "is injected into an area of your body to numb everything below the injection site (regional anesthetic).  · Your surgical site will be marked or identified.  · You may be given an antibiotic through your IV to help prevent infection.  Contact a health care provider if you:  · Develop a fever of more than 100.4°F (38°C) or other feelings of illness during the 48 hours before your surgery.  · Have symptoms that get worse.  Have questions or concerns about your surgery    General Anesthesia/Surgery, Adult  General anesthesia is the use of medicines to make a person \"go to sleep\" (unconscious) for a medical procedure. General anesthesia must be used for certain procedures, and is often recommended for procedures that:  · Last a long time.  · Require you to be still or in an unusual position.  · Are major and can cause blood loss.  The medicines used for general anesthesia are called general anesthetics. As well as making you unconscious for a certain amount of time, these medicines:  · Prevent pain.  · Control your blood pressure.  · Relax your muscles.  Tell a health care provider about:  · Any allergies you have.  · All medicines you are taking, including vitamins, herbs, eye drops, creams, and over-the-counter medicines.  · Any problems you or family members have had with anesthetic medicines.  · Types of anesthetics you have had in the past.  · Any blood disorders you have.  · Any surgeries you have had.  · Any medical conditions you have.  · Any recent upper respiratory, chest, or ear infections.  · Any history of:  ? Heart or lung conditions, such as heart failure, sleep apnea, asthma, or chronic obstructive pulmonary disease (COPD).  ?  service.  ? Depression or anxiety.  · Any tobacco or drug use, including marijuana or alcohol use.  · Whether you are pregnant or may be pregnant.  What are the risks?  Generally, this is a safe procedure. However, problems may occur, including:  · Allergic " reaction.  · Lung and heart problems.  · Inhaling food or liquid from the stomach into the lungs (aspiration).  · Nerve injury.  · Air in the bloodstream, which can lead to stroke.  · Extreme agitation or confusion (delirium) when you wake up from the anesthetic.  · Waking up during your procedure and being unable to move. This is rare.  These problems are more likely to develop if you are having a major surgery or if you have an advanced or serious medical condition. You can prevent some of these complications by answering all of your health care provider's questions thoroughly and by following all instructions before your procedure.  General anesthesia can cause side effects, including:  · Nausea or vomiting.  · A sore throat from the breathing tube.  · Hoarseness.  · Wheezing or coughing.  · Shaking chills.  · Tiredness.  · Body aches.  · Anxiety.  · Sleepiness or drowsiness.  · Confusion or agitation.  RISKS AND COMPLICATIONS OF SURGERY  Your health care provider will discuss possible risks and complications with you before surgery. Common risks and complications include:    · Problems due to the use of anesthetics.  · Blood loss and replacement (does not apply to minor surgical procedures).  · Temporary increase in pain due to surgery.  · Uncorrected pain or problems that the surgery was meant to correct.  · Infection.  · New damage.    What happens before the procedure?    Medicines  Ask your health care provider about:  · Changing or stopping your regular medicines. This is especially important if you are taking diabetes medicines or blood thinners.  · Taking medicines such as aspirin and ibuprofen. These medicines can thin your blood. Do not take these medicines unless your health care provider tells you to take them.  · Taking over-the-counter medicines, vitamins, herbs, and supplements. Do not take these during the week before your procedure unless your health care provider approves them.  General  instructions  · Starting 3-6 weeks before the procedure, do not use any products that contain nicotine or tobacco, such as cigarettes and e-cigarettes. If you need help quitting, ask your health care provider.  · If you brush your teeth on the morning of the procedure, make sure to spit out all of the toothpaste.  · Tell your health care provider if you become ill or develop a cold, cough, or fever.  · If instructed by your health care provider, bring your sleep apnea device with you on the day of your surgery (if applicable).  · Ask your health care provider if you will be going home the same day, the following day, or after a longer hospital stay.  ? Plan to have someone take you home from the hospital or clinic.  ? Plan to have a responsible adult care for you for at least 24 hours after you leave the hospital or clinic. This is important.  What happens during the procedure?  · You will be given anesthetics through both of the following:  ? A mask placed over your nose and mouth.  ? An IV in one of your veins.  · You may receive a medicine to help you relax (sedative).  · After you are unconscious, a breathing tube may be inserted down your throat to help you breathe. This will be removed before you wake up.  · An anesthesia specialist will stay with you throughout your procedure. He or she will:  ? Keep you comfortable and safe by continuing to give you medicines and adjusting the amount of medicine that you get.  ? Monitor your blood pressure, pulse, and oxygen levels to make sure that the anesthetics do not cause any problems.  The procedure may vary among health care providers and hospitals.  What happens after the procedure?  · Your blood pressure, temperature, heart rate, breathing rate, and blood oxygen level will be monitored until the medicines you were given have worn off.  · You will wake up in a recovery area. You may wake up slowly.  · If you feel anxious or agitated, you may be given medicine to  help you calm down.  · If you will be going home the same day, your health care provider may check to make sure you can walk, drink, and urinate.  · Your health care provider will treat any pain or side effects you have before you go home.  · Do not drive for 24 hours if you were given a sedative.  Summary  · General anesthesia is used to keep you still and prevent pain during a procedure.  · It is important to tell your healthcare provider about your medical history and any surgeries you have had, and previous experience with anesthesia.  · Follow your healthcare provider’s instructions about when to stop eating, drinking, or taking certain medicines before your procedure.  · Plan to have someone take you home from the hospital or clinic.  This information is not intended to replace advice given to you by your health care provider. Make sure you discuss any questions you have with your health care provider.  Document Released: 03/26/2009 Document Revised: 08/03/2018 Document Reviewed: 08/03/2018  [employer] Interactive Patient Education © 2019 [employer]       Fall Prevention in Hospitals, Adult  As a hospital patient, your condition and the treatments you receive can increase your risk for falls. Some additional risk factors for falls in a hospital include:  · Being in an unfamiliar environment.  · Being on bed rest.  · Your surgery.  · Taking certain medicines.  · Your tubing requirements, such as intravenous (IV) therapy or catheters.  It is important that you learn how to decrease fall risks while at the hospital. Below are important tips that can help prevent falls.  SAFETY TIPS FOR PREVENTING FALLS  Talk about your risk of falling.  · Ask your health care provider why you are at risk for falling. Is it your medicine, illness, tubing placement, or something else?  · Make a plan with your health care provider to keep you safe from falls.  · Ask your health care provider or pharmacist about side effects of your  medicines. Some medicines can make you dizzy or affect your coordination.  Ask for help.  · Ask for help before getting out of bed. You may need to press your call button.  · Ask for assistance in getting safely to the toilet.  · Ask for a walker or cane to be put at your bedside. Ask that most of the side rails on your bed be placed up before your health care provider leaves the room.  · Ask family or friends to sit with you.  · Ask for things that are out of your reach, such as your glasses, hearing aids, telephone, bedside table, or call button.  Follow these tips to avoid falling:  · Stay lying or seated, rather than standing, while waiting for help.  · Wear rubber-soled slippers or shoes whenever you walk in the hospital.  · Avoid quick, sudden movements.  ¨ Change positions slowly.  ¨ Sit on the side of your bed before standing.  ¨ Stand up slowly and wait before you start to walk.  · Let your health care provider know if there is a spill on the floor.  · Pay careful attention to the medical equipment, electrical cords, and tubes around you.  · When you need help, use your call button by your bed or in the bathroom. Wait for one of your health care providers to help you.  · If you feel dizzy or unsure of your footing, return to bed and wait for assistance.  · Avoid being distracted by the TV, telephone, or another person in your room.  · Do not lean or support yourself on rolling objects, such as IV poles or bedside tables.     This information is not intended to replace advice given to you by your health care provider. Make sure you discuss any questions you have with your health care provider.     Document Released: 12/15/2001 Document Revised: 01/08/2016 Document Reviewed: 08/25/2013  [employer] Interactive Patient Education ©2016 [employer]       [facility]  CHG 4% Patient Instruction Sheet    Chlorhexidine Before Surgery  Chlorhexidine gluconate (CHG) is a germ-killing (antiseptic) solution  that is used to clean the skin. It gets rid of the bacteria that normally live on the skin. Cleaning your skin with CHG before surgery helps lower the risk for infection after surgery.    How to use CHG solution  · You will take 2 showers, one shower the night before surgery, the second shower the morning of surgery before coming to the hospital.  · Use CHG only as told by your health care provider, and follow the instructions on the label.  · Use CHG solution while taking a shower. Follow these steps when using CHG solution (unless your health care provider gives you different instructions):  1. Start the shower.  2. Use your normal soap and shampoo to wash your face and hair.  3. Turn off the shower or move out of the shower stream.  4. Pour the CHG onto a clean washcloth. Do not use any type of brush or rough-edged sponge.  5. Starting at your neck, lather your body down to your toes. Make sure you:  6. Pay special attention to the part of your body where you will be having surgery. Scrub this area for at least 1 minute.  7. Use the full amount of CHG as directed. Usually, this is one half bottle for each shower.  8. Do not use CHG on your head or face. If the solution gets into your ears or eyes, rinse them well with water.  9. Avoid your genital area.  10. Avoid any areas of skin that have broken skin, cuts, or scrapes.  11. Scrub your back and under your arms. Make sure to wash skin folds.  12. Let the lather sit on your skin for 1-2 minutes or as long as told by your health care  provider.  13. Thoroughly rinse your entire body in the shower. Make sure that all body creases and crevices are rinsed well.  14. Dry off with a clean towel. Do not put any substances on your body afterward, such as powder, lotion, or perfume.  15. Put on clean clothes or pajamas.  16. If it is the night before your surgery, sleep in clean sheets.    What are the risks?  Risks of using CHG include:  · A skin reaction.  · Hearing  loss, if CHG gets in your ears.  · Eye injury, if CHG gets in your eyes and is not rinsed out.  · The CHG product catching fire.  Make sure that you avoid smoking and flames after applying CHG to your skin.  Do not use CHG:  · If you have a chlorhexidine allergy or have previously reacted to chlorhexidine.  · On babies younger than 2 months of age.      On the day of surgery, when you are taken to your room in Outpatient Surgery you will be given a CHG prepackaged cloth to wipe the site for your surgery.  How to use CHG prepackaged cloths  · Follow the instructions on the label.  · Use the CHG cloth on clean, dry skin. Follow these steps when using a CHG cloth (unless your health care provider gives you different instructions):  1. Using the CHG cloth, vigorously scrub the part of your body where you will be having surgery. Scrub using a back-and-forth motion for 3 minutes. The area on your body should be completely wet with CHG when you are finished scrubbing.  2. Do not rinse. Discard the cloth and let the area air-dry for 1 minute. Do not put any substances on your body afterward, such as powder, lotion, or perfume.  Contact a health care provider if:  · Your skin gets irritated after scrubbing.  · You have questions about using your solution or cloth.  Get help right away if:  · Your eyes become very red or swollen.  · Your eyes itch badly.  · Your skin itches badly and is red or swollen.  · Your hearing changes.  · You have trouble seeing.  · You have swelling or tingling in your mouth or throat.  · You have trouble breathing.  · You swallow any chlorhexidine.  Summary  · Chlorhexidine gluconate (CHG) is a germ-killing (antiseptic) solution that is used to clean the skin. Cleaning your skin with CHG before surgery helps lower the risk for infection after surgery.  · You may be given CHG to use at home. It may be in a bottle or in a prepackaged cloth to use on your skin. Carefully follow your health care  provider's instructions and the instructions on the product label.  · Do not use CHG if you have a chlorhexidine allergy.  · Contact your health care provider if your skin gets irritated after scrubbing.  This information is not intended to replace advice given to you by your health care provider. Make sure you discuss any questions you have with your health care provider.  Document Released: 09/11/2013 Document Revised: 11/15/2018 Document Reviewed: 11/15/2018  [employer] Interactive Patient Education © 2019 [employer]          PATIENT/FAMILY/RESPONSIBLE PARTY VERBALIZES UNDERSTANDING OF ABOVE EDUCATION.  COPY OF PAIN SCALE GIVEN AND REVIEWED WITH VERBALIZED UNDERSTANDING.

## 2020-02-05 NOTE — H&P
Jj Danielson MD  [facility]  [address]  [place] [address]  Office [phone]  Fax [phone]      [facility]  Beverley Silverio  1985  [id number]  [id number]  2020    Subjective   Beverley Silverio is a 34 y.o. year old female  who presents for surgery due to Chronic Pelvic Pain and Dysmenorrhea.  Failed conservative measures include NSAIDS, Endometrial Ablation and lysteda.  Although flow improved with ablation, pain has worsened and flow still present.  No improvement with NSAIDs or Lysteda.    COEMIG Procedure yes  Rating of Pain 9  Effect on daily activities Significant    HPI    Risks, benefits, and alternatives of a hysterectomy were discussed with the patient in detail. Intraoperative risks of bleeding and damage to surrounding organs, including but not limited to intestine, bladder and ureter, were explained. Management of these were also explained. Postoperative complications such as infection, pneumonia, DVT, and bleeding were explained. Cuff dehiscence and cuff hematoma/cellulitis were also explained. The importance of compliance with postoperative restrictions was discussed. Long term effect regarding pelvic organ prolapse was also explained. Laparoscopic approach with use of the da Jose robotic system was explained. Indications for conversion to a laparotomy were discussed.     Ovarian conservation/removal was also discussed. Menopausal symptoms associated with adnexectomy were explained in detail. Reduction in the risk of ovarian cancer was discussed. Unknown benefits of ovarian conservation were also discussed.     Removal of fallopian tubes regardless of ovarian removal was discussed and patient verbalized understanding.    All of the patient's questions were answered to her satisfaction. She was encouraged to return for an additional appointment if she had further questions. She verbalized understanding of the above and wished  to proceed with the outlined plan.    Desires ovarian conservation      Past Medical History:   Diagnosis Date   • Abnormal Pap smear of cervix    • Anxiety    • Family history of thyroid cancer    • Menorrhagia with irregular cycle    • Migraine    • S/P right thyroidectomy 2019   • Thyroid nodule        Past Surgical History:   Procedure Laterality Date   •  SECTION WITH TUBAL  2005   • D&C HYSTEROSCOPY ENDOMETRIAL ABLATION N/A 2017    Procedure: DILATATION AND CURETTAGE HYSTEROSCOPY NOVASURE ENDOMETRIAL ABLATION;  Surgeon: Jj Danielson MD;  Location: [facility] OR;  Service:    • THYROIDECTOMY Right 2019    Procedure: RIGHT THYROIDECTOMY with laryngeal nerve monitor;  Surgeon: Cyrus Gil MD;  Location:  [facility] OR;  Service: ENT         Current Outpatient Medications:   •  ibuprofen (ADVIL,MOTRIN) 800 MG tablet, Take 800 mg by mouth As Needed., Disp: , Rfl:   •  Tranexamic Acid 650 MG tablet, Take 2 tablets by mouth 3 (Three) Times a Day As Needed (Painful menstruation)., Disp: 30 tablet, Rfl: 3    Allergies   Allergen Reactions   • Sulfa Antibiotics Rash       Family History   Problem Relation Age of Onset   • Skin cancer Father    • No Known Problems Mother    • No Known Problems Sister    • No Known Problems Daughter    • Ovarian cancer Paternal Grandmother    • Breast cancer Paternal Aunt    • Colon cancer Neg Hx    • Colon polyps Neg Hx    • Uterine cancer Neg Hx        Social History     Socioeconomic History   • Marital status:      Spouse name: Not on file   • Number of children: Not on file   • Years of education: Not on file   • Highest education level: Not on file   Tobacco Use   • Smoking status: Former Smoker     Packs/day: 0.00     Years: 17.00     Pack years: 0.00     Types: Cigarettes     Last attempt to quit: 10/10/2018     Years since quittin.3   • Smokeless tobacco: Never Used   • Tobacco comment: QUIT  2 YRS AGO   Substance and Sexual Activity   •  Alcohol use: Yes     Comment: SOCIALLY   • Drug use: No   • Sexual activity: Yes     Partners: Male     Birth control/protection: Surgical       OB History    Para Term  AB Living   1 1 1 0 0 1   SAB TAB Ectopic Molar Multiple Live Births   0 0 0 0 0 1      # Outcome Date GA Lbr Gilberto/2nd Weight Sex Delivery Anes PTL Lv   1 Term 05 40w0d  2977 g (6 lb 9 oz) F CS-LTranv Spinal N LARISSA      Complications: Fetal Intolerance       Review of Systems   Genitourinary: Positive for menstrual problem and pelvic pain.   All other systems reviewed and are negative.         Objective   Vitals:    20 1037   BP: 110/80       Physical Exam   Constitutional: She is oriented to person, place, and time. She appears well-developed and well-nourished.   HENT:   Head: Normocephalic and atraumatic.   Neck: Normal range of motion. Neck supple.   Cardiovascular: Normal rate and regular rhythm.   Pulmonary/Chest: Effort normal and breath sounds normal.   Abdominal: Soft. Bowel sounds are normal.   Neurological: She is alert and oriented to person, place, and time.   Skin: Skin is warm and dry.   Psychiatric: She has a normal mood and affect. Her behavior is normal. Judgment and thought content normal.   Nursing note and vitals reviewed.         Assessment/Plan   Assessment/Plan:  Beverley was seen today for dysmenorrhea.    Diagnoses and all orders for this visit:    Dysmenorrhea    Non-smoker      Plan Da Jose Hysterectomy with Bilateral Salpingectomy and Cystoscopy  The risks, benefits, and alternatives of the procedure; along with the risks of anesthesia was discussed in full with the patient and all questions were answered.       Jj Danielson MD

## 2020-02-05 NOTE — H&P (VIEW-ONLY)
Jj Danielson MD  [facility]  [address]  [place] [address]  Office [phone]  Fax [phone]      [facility]  Beverley Silverio  1985  [id number]  [id number]  2020    Subjective   Beverley Silverio is a 34 y.o. year old female  who presents for surgery due to Chronic Pelvic Pain and Dysmenorrhea.  Failed conservative measures include NSAIDS, Endometrial Ablation and lysteda.  Although flow improved with ablation, pain has worsened and flow still present.  No improvement with NSAIDs or Lysteda.    COEMIG Procedure yes  Rating of Pain 9  Effect on daily activities Significant    HPI    Risks, benefits, and alternatives of a hysterectomy were discussed with the patient in detail. Intraoperative risks of bleeding and damage to surrounding organs, including but not limited to intestine, bladder and ureter, were explained. Management of these were also explained. Postoperative complications such as infection, pneumonia, DVT, and bleeding were explained. Cuff dehiscence and cuff hematoma/cellulitis were also explained. The importance of compliance with postoperative restrictions was discussed. Long term effect regarding pelvic organ prolapse was also explained. Laparoscopic approach with use of the da Jose robotic system was explained. Indications for conversion to a laparotomy were discussed.     Ovarian conservation/removal was also discussed. Menopausal symptoms associated with adnexectomy were explained in detail. Reduction in the risk of ovarian cancer was discussed. Unknown benefits of ovarian conservation were also discussed.     Removal of fallopian tubes regardless of ovarian removal was discussed and patient verbalized understanding.    All of the patient's questions were answered to her satisfaction. She was encouraged to return for an additional appointment if she had further questions. She verbalized understanding of the above and wished  to proceed with the outlined plan.    Desires ovarian conservation      Past Medical History:   Diagnosis Date   • Abnormal Pap smear of cervix    • Anxiety    • Family history of thyroid cancer    • Menorrhagia with irregular cycle    • Migraine    • S/P right thyroidectomy 2019   • Thyroid nodule        Past Surgical History:   Procedure Laterality Date   •  SECTION WITH TUBAL  2005   • D&C HYSTEROSCOPY ENDOMETRIAL ABLATION N/A 2017    Procedure: DILATATION AND CURETTAGE HYSTEROSCOPY NOVASURE ENDOMETRIAL ABLATION;  Surgeon: Jj Danielson MD;  Location: [facility] OR;  Service:    • THYROIDECTOMY Right 2019    Procedure: RIGHT THYROIDECTOMY with laryngeal nerve monitor;  Surgeon: Cyrus Gil MD;  Location:  [facility] OR;  Service: ENT         Current Outpatient Medications:   •  ibuprofen (ADVIL,MOTRIN) 800 MG tablet, Take 800 mg by mouth As Needed., Disp: , Rfl:   •  Tranexamic Acid 650 MG tablet, Take 2 tablets by mouth 3 (Three) Times a Day As Needed (Painful menstruation)., Disp: 30 tablet, Rfl: 3    Allergies   Allergen Reactions   • Sulfa Antibiotics Rash       Family History   Problem Relation Age of Onset   • Skin cancer Father    • No Known Problems Mother    • No Known Problems Sister    • No Known Problems Daughter    • Ovarian cancer Paternal Grandmother    • Breast cancer Paternal Aunt    • Colon cancer Neg Hx    • Colon polyps Neg Hx    • Uterine cancer Neg Hx        Social History     Socioeconomic History   • Marital status:      Spouse name: Not on file   • Number of children: Not on file   • Years of education: Not on file   • Highest education level: Not on file   Tobacco Use   • Smoking status: Former Smoker     Packs/day: 0.00     Years: 17.00     Pack years: 0.00     Types: Cigarettes     Last attempt to quit: 10/10/2018     Years since quittin.3   • Smokeless tobacco: Never Used   • Tobacco comment: QUIT  2 YRS AGO   Substance and Sexual Activity   •  Alcohol use: Yes     Comment: SOCIALLY   • Drug use: No   • Sexual activity: Yes     Partners: Male     Birth control/protection: Surgical       OB History    Para Term  AB Living   1 1 1 0 0 1   SAB TAB Ectopic Molar Multiple Live Births   0 0 0 0 0 1      # Outcome Date GA Lbr Gilberto/2nd Weight Sex Delivery Anes PTL Lv   1 Term 05 40w0d  2977 g (6 lb 9 oz) F CS-LTranv Spinal N LARISSA      Complications: Fetal Intolerance       Review of Systems   Genitourinary: Positive for menstrual problem and pelvic pain.   All other systems reviewed and are negative.         Objective   Vitals:    20 1037   BP: 110/80       Physical Exam   Constitutional: She is oriented to person, place, and time. She appears well-developed and well-nourished.   HENT:   Head: Normocephalic and atraumatic.   Neck: Normal range of motion. Neck supple.   Cardiovascular: Normal rate and regular rhythm.   Pulmonary/Chest: Effort normal and breath sounds normal.   Abdominal: Soft. Bowel sounds are normal.   Neurological: She is alert and oriented to person, place, and time.   Skin: Skin is warm and dry.   Psychiatric: She has a normal mood and affect. Her behavior is normal. Judgment and thought content normal.   Nursing note and vitals reviewed.         Assessment/Plan   Assessment/Plan:  Beverley was seen today for dysmenorrhea.    Diagnoses and all orders for this visit:    Dysmenorrhea    Non-smoker      Plan Da Jose Hysterectomy with Bilateral Salpingectomy and Cystoscopy  The risks, benefits, and alternatives of the procedure; along with the risks of anesthesia was discussed in full with the patient and all questions were answered.       Jj Danielson MD

## 2020-02-19 ENCOUNTER — NURSE TRIAGE (OUTPATIENT)
Dept: CALL CENTER | Facility: HOSPITAL | Age: 35
End: 2020-02-19

## 2020-02-20 NOTE — TELEPHONE ENCOUNTER
"    Reason for Disposition  • Caller has medication question, adult has minor symptoms, caller declines triage, and triager answers question    Additional Information  • Negative: Drug overdose and nurse unable to answer question  • Negative: Caller requesting information not related to medicine  • Negative: Caller requesting a prescription for Strep throat and has a positive culture result  • Negative: Rash while taking a medication or within 3 days of stopping it  • Negative: Immunization reaction suspected  • Negative: [1] Asthma and [2] having symptoms of asthma (cough, wheezing, etc)  • Negative: MORE THAN A DOUBLE DOSE of a prescription or over-the-counter (OTC) drug  • Negative: [1] DOUBLE DOSE (an extra dose or lesser amount) of over-the-counter (OTC) drug AND [2] any symptoms (e.g., dizziness, nausea, pain, sleepiness)  • Negative: [1] DOUBLE DOSE (an extra dose or lesser amount) of prescription drug AND [2] any symptoms (e.g., dizziness, nausea, pain, sleepiness)  • Negative: Took another person's prescription drug  • Negative: [1] DOUBLE DOSE (an extra dose or lesser amount) of prescription drug AND [2] NO symptoms (Exception: a double dose of antibiotics)  • Negative: Diabetes drug error or overdose (e.g., insulin or extra dose)  • Negative: [1] Request for URGENT new prescription or refill of \"essential\" medication (i.e., likelihood of harm to patient if not taken) AND [2] triager unable to fill per unit policy  • Negative: [1] Prescription not at pharmacy AND [2] was prescribed today by PCP  • Negative: Pharmacy calling with prescription questions and triager unable to answer question  • Negative: Caller has URGENT medication question about med that PCP prescribed and triager unable to answer question  • Negative: Caller has NON-URGENT medication question about med that PCP prescribed and triager unable to answer question  • Negative: Caller requesting a NON-URGENT new prescription or refill and " "triager unable to refill per unit policy  • Negative: Caller has medication question about med not prescribed by PCP and triager unable to answer question (e.g., compatibility with other med, storage)  • Negative: [1] DOUBLE DOSE (an extra dose or lesser amount) of over-the-counter (OTC) drug AND [2] NO symptoms  • Negative: [1] DOUBLE DOSE (an extra dose or lesser amount) of antibiotic drug AND [2] NO symptoms  • Negative: Caller has medication question only, adult not sick, and triager answers question    Answer Assessment - Initial Assessment Questions  1. SYMPTOMS: \"Do you have any symptoms?\"      Asking what pain medication she can take before her scheduled surgery on Friday.  Advised she can take tylenol.    2. SEVERITY: If symptoms are present, ask \"Are they mild, moderate or severe?\"      She is for a hysterectomy and started her period and having moderate pain.    Protocols used: MEDICATION QUESTION CALL-ADULT-AH      "

## 2020-02-21 ENCOUNTER — NURSE TRIAGE (OUTPATIENT)
Dept: CALL CENTER | Facility: HOSPITAL | Age: 35
End: 2020-02-21

## 2020-02-21 ENCOUNTER — HOSPITAL ENCOUNTER (OUTPATIENT)
Facility: HOSPITAL | Age: 35
Setting detail: HOSPITAL OUTPATIENT SURGERY
Discharge: HOME | End: 2020-02-21
Attending: OBSTETRICS & GYNECOLOGY | Admitting: OBSTETRICS & GYNECOLOGY

## 2020-02-21 ENCOUNTER — ANESTHESIA (OUTPATIENT)
Dept: PERIOP | Facility: HOSPITAL | Age: 35
End: 2020-02-21

## 2020-02-21 ENCOUNTER — ANESTHESIA EVENT (OUTPATIENT)
Dept: PERIOP | Facility: HOSPITAL | Age: 35
End: 2020-02-21

## 2020-02-21 VITALS
OXYGEN SATURATION: 94 % | DIASTOLIC BLOOD PRESSURE: 81 MMHG | TEMPERATURE: 97.6 F | SYSTOLIC BLOOD PRESSURE: 132 MMHG | RESPIRATION RATE: 18 BRPM | HEART RATE: 107 BPM

## 2020-02-21 DIAGNOSIS — N94.6 DYSMENORRHEA: ICD-10-CM

## 2020-02-21 DIAGNOSIS — E04.1 THYROID NODULE: ICD-10-CM

## 2020-02-21 DIAGNOSIS — E05.90 HYPERTHYROIDISM: ICD-10-CM

## 2020-02-21 LAB
ABO GROUP BLD: NORMAL
BLD GP AB SCN SERPL QL: NEGATIVE
HCG SERPL QL: NEGATIVE
RH BLD: POSITIVE
T&S EXPIRATION DATE: NORMAL

## 2020-02-21 PROCEDURE — 25010000002 NEOSTIGMINE 10 MG/10ML SOLUTION 10 ML VIAL: Performed by: NURSE ANESTHETIST, CERTIFIED REGISTERED

## 2020-02-21 PROCEDURE — 25010000002 DEXAMETHASONE PER 1 MG: Performed by: NURSE ANESTHETIST, CERTIFIED REGISTERED

## 2020-02-21 PROCEDURE — 86850 RBC ANTIBODY SCREEN: CPT | Performed by: OBSTETRICS & GYNECOLOGY

## 2020-02-21 PROCEDURE — 25010000002 MIDAZOLAM PER 1 MG: Performed by: ANESTHESIOLOGY

## 2020-02-21 PROCEDURE — 25010000002 ONDANSETRON PER 1 MG: Performed by: ANESTHESIOLOGY

## 2020-02-21 PROCEDURE — 86900 BLOOD TYPING SEROLOGIC ABO: CPT | Performed by: OBSTETRICS & GYNECOLOGY

## 2020-02-21 PROCEDURE — 25010000002 FENTANYL CITRATE (PF) 250 MCG/5ML SOLUTION: Performed by: NURSE ANESTHETIST, CERTIFIED REGISTERED

## 2020-02-21 PROCEDURE — 88307 TISSUE EXAM BY PATHOLOGIST: CPT | Performed by: OBSTETRICS & GYNECOLOGY

## 2020-02-21 PROCEDURE — 25010000002 HYDROMORPHONE PER 4 MG: Performed by: NURSE ANESTHETIST, CERTIFIED REGISTERED

## 2020-02-21 PROCEDURE — S2900 ROBOTIC SURGICAL SYSTEM: HCPCS | Performed by: OBSTETRICS & GYNECOLOGY

## 2020-02-21 PROCEDURE — 84703 CHORIONIC GONADOTROPIN ASSAY: CPT | Performed by: OBSTETRICS & GYNECOLOGY

## 2020-02-21 PROCEDURE — 25010000002 DEXAMETHASONE PER 1 MG: Performed by: ANESTHESIOLOGY

## 2020-02-21 PROCEDURE — 25010000002 FUROSEMIDE PER 20 MG: Performed by: NURSE ANESTHETIST, CERTIFIED REGISTERED

## 2020-02-21 PROCEDURE — 25010000002 ONDANSETRON PER 1 MG: Performed by: NURSE ANESTHETIST, CERTIFIED REGISTERED

## 2020-02-21 PROCEDURE — 25010000002 PROPOFOL 10 MG/ML EMULSION: Performed by: NURSE ANESTHETIST, CERTIFIED REGISTERED

## 2020-02-21 PROCEDURE — 86901 BLOOD TYPING SEROLOGIC RH(D): CPT | Performed by: OBSTETRICS & GYNECOLOGY

## 2020-02-21 PROCEDURE — 58571 TLH W/T/O 250 G OR LESS: CPT | Performed by: OBSTETRICS & GYNECOLOGY

## 2020-02-21 RX ORDER — LABETALOL HYDROCHLORIDE 5 MG/ML
5 INJECTION, SOLUTION INTRAVENOUS
Status: DISCONTINUED | OUTPATIENT
Start: 2020-02-21 | End: 2020-02-21 | Stop reason: HOSPADM

## 2020-02-21 RX ORDER — DEXTROSE MONOHYDRATE 25 G/50ML
12.5 INJECTION, SOLUTION INTRAVENOUS AS NEEDED
Status: DISCONTINUED | OUTPATIENT
Start: 2020-02-21 | End: 2020-02-21 | Stop reason: HOSPADM

## 2020-02-21 RX ORDER — SODIUM CHLORIDE 0.9 % (FLUSH) 0.9 %
10 SYRINGE (ML) INJECTION AS NEEDED
Status: DISCONTINUED | OUTPATIENT
Start: 2020-02-21 | End: 2020-02-21 | Stop reason: HOSPADM

## 2020-02-21 RX ORDER — LIDOCAINE HYDROCHLORIDE 20 MG/ML
INJECTION, SOLUTION INFILTRATION; PERINEURAL AS NEEDED
Status: DISCONTINUED | OUTPATIENT
Start: 2020-02-21 | End: 2020-02-21 | Stop reason: SURG

## 2020-02-21 RX ORDER — MIDAZOLAM HYDROCHLORIDE 1 MG/ML
2 INJECTION INTRAMUSCULAR; INTRAVENOUS
Status: DISCONTINUED | OUTPATIENT
Start: 2020-02-21 | End: 2020-02-21 | Stop reason: HOSPADM

## 2020-02-21 RX ORDER — HYDRALAZINE HYDROCHLORIDE 20 MG/ML
5 INJECTION INTRAMUSCULAR; INTRAVENOUS
Status: DISCONTINUED | OUTPATIENT
Start: 2020-02-21 | End: 2020-02-21 | Stop reason: HOSPADM

## 2020-02-21 RX ORDER — IPRATROPIUM BROMIDE AND ALBUTEROL SULFATE 2.5; .5 MG/3ML; MG/3ML
3 SOLUTION RESPIRATORY (INHALATION) ONCE AS NEEDED
Status: DISCONTINUED | OUTPATIENT
Start: 2020-02-21 | End: 2020-02-21 | Stop reason: HOSPADM

## 2020-02-21 RX ORDER — HYDROMORPHONE HCL 110MG/55ML
PATIENT CONTROLLED ANALGESIA SYRINGE INTRAVENOUS AS NEEDED
Status: DISCONTINUED | OUTPATIENT
Start: 2020-02-21 | End: 2020-02-21 | Stop reason: SURG

## 2020-02-21 RX ORDER — MORPHINE SULFATE 2 MG/ML
2 INJECTION, SOLUTION INTRAMUSCULAR; INTRAVENOUS
Status: DISCONTINUED | OUTPATIENT
Start: 2020-02-21 | End: 2020-02-21 | Stop reason: HOSPADM

## 2020-02-21 RX ORDER — ONDANSETRON 2 MG/ML
INJECTION INTRAMUSCULAR; INTRAVENOUS AS NEEDED
Status: DISCONTINUED | OUTPATIENT
Start: 2020-02-21 | End: 2020-02-21 | Stop reason: SURG

## 2020-02-21 RX ORDER — IBUPROFEN 200 MG
600 TABLET ORAL EVERY 6 HOURS PRN
Status: DISCONTINUED | OUTPATIENT
Start: 2020-02-21 | End: 2020-02-21 | Stop reason: HOSPADM

## 2020-02-21 RX ORDER — PROPOFOL 10 MG/ML
VIAL (ML) INTRAVENOUS AS NEEDED
Status: DISCONTINUED | OUTPATIENT
Start: 2020-02-21 | End: 2020-02-21 | Stop reason: SURG

## 2020-02-21 RX ORDER — ONDANSETRON 2 MG/ML
4 INJECTION INTRAMUSCULAR; INTRAVENOUS AS NEEDED
Status: DISCONTINUED | OUTPATIENT
Start: 2020-02-21 | End: 2020-02-21 | Stop reason: HOSPADM

## 2020-02-21 RX ORDER — LIDOCAINE HYDROCHLORIDE 40 MG/ML
SOLUTION TOPICAL AS NEEDED
Status: DISCONTINUED | OUTPATIENT
Start: 2020-02-21 | End: 2020-02-21 | Stop reason: SURG

## 2020-02-21 RX ORDER — SODIUM CHLORIDE, SODIUM LACTATE, POTASSIUM CHLORIDE, CALCIUM CHLORIDE 600; 310; 30; 20 MG/100ML; MG/100ML; MG/100ML; MG/100ML
9 INJECTION, SOLUTION INTRAVENOUS CONTINUOUS
Status: DISCONTINUED | OUTPATIENT
Start: 2020-02-21 | End: 2020-02-21 | Stop reason: HOSPADM

## 2020-02-21 RX ORDER — PHENYLEPHRINE HCL IN 0.9% NACL 0.8MG/10ML
SYRINGE (ML) INTRAVENOUS AS NEEDED
Status: DISCONTINUED | OUTPATIENT
Start: 2020-02-21 | End: 2020-02-21 | Stop reason: SURG

## 2020-02-21 RX ORDER — SODIUM CHLORIDE 0.9 % (FLUSH) 0.9 %
10 SYRINGE (ML) INJECTION EVERY 12 HOURS SCHEDULED
Status: DISCONTINUED | OUTPATIENT
Start: 2020-02-21 | End: 2020-02-21 | Stop reason: HOSPADM

## 2020-02-21 RX ORDER — KETAMINE HYDROCHLORIDE 50 MG/ML
INJECTION, SOLUTION, CONCENTRATE INTRAMUSCULAR; INTRAVENOUS AS NEEDED
Status: DISCONTINUED | OUTPATIENT
Start: 2020-02-21 | End: 2020-02-21 | Stop reason: SURG

## 2020-02-21 RX ORDER — SODIUM CHLORIDE 0.9 % (FLUSH) 0.9 %
3 SYRINGE (ML) INJECTION EVERY 12 HOURS SCHEDULED
Status: DISCONTINUED | OUTPATIENT
Start: 2020-02-21 | End: 2020-02-21 | Stop reason: HOSPADM

## 2020-02-21 RX ORDER — BUPIVACAINE HCL/0.9 % NACL/PF 0.1 %
2 PLASTIC BAG, INJECTION (ML) EPIDURAL ONCE
Status: COMPLETED | OUTPATIENT
Start: 2020-02-21 | End: 2020-02-21

## 2020-02-21 RX ORDER — OXYCODONE AND ACETAMINOPHEN 7.5; 325 MG/1; MG/1
1-2 TABLET ORAL EVERY 4 HOURS PRN
Qty: 20 TABLET | Refills: 0 | Status: SHIPPED | OUTPATIENT
Start: 2020-02-21 | End: 2020-03-06

## 2020-02-21 RX ORDER — LIDOCAINE HYDROCHLORIDE 10 MG/ML
0.5 INJECTION, SOLUTION EPIDURAL; INFILTRATION; INTRACAUDAL; PERINEURAL ONCE AS NEEDED
Status: DISCONTINUED | OUTPATIENT
Start: 2020-02-21 | End: 2020-02-21 | Stop reason: HOSPADM

## 2020-02-21 RX ORDER — GLYCOPYRROLATE 0.2 MG/ML
INJECTION INTRAMUSCULAR; INTRAVENOUS AS NEEDED
Status: DISCONTINUED | OUTPATIENT
Start: 2020-02-21 | End: 2020-02-21 | Stop reason: SURG

## 2020-02-21 RX ORDER — MIDAZOLAM HYDROCHLORIDE 1 MG/ML
1 INJECTION INTRAMUSCULAR; INTRAVENOUS
Status: DISCONTINUED | OUTPATIENT
Start: 2020-02-21 | End: 2020-02-21 | Stop reason: HOSPADM

## 2020-02-21 RX ORDER — FENTANYL CITRATE 50 UG/ML
INJECTION, SOLUTION INTRAMUSCULAR; INTRAVENOUS AS NEEDED
Status: DISCONTINUED | OUTPATIENT
Start: 2020-02-21 | End: 2020-02-21 | Stop reason: SURG

## 2020-02-21 RX ORDER — FLUMAZENIL 0.1 MG/ML
0.2 INJECTION INTRAVENOUS AS NEEDED
Status: DISCONTINUED | OUTPATIENT
Start: 2020-02-21 | End: 2020-02-21 | Stop reason: HOSPADM

## 2020-02-21 RX ORDER — NALOXONE HCL 0.4 MG/ML
0.04 VIAL (ML) INJECTION AS NEEDED
Status: DISCONTINUED | OUTPATIENT
Start: 2020-02-21 | End: 2020-02-21 | Stop reason: HOSPADM

## 2020-02-21 RX ORDER — ROCURONIUM BROMIDE 10 MG/ML
INJECTION, SOLUTION INTRAVENOUS AS NEEDED
Status: DISCONTINUED | OUTPATIENT
Start: 2020-02-21 | End: 2020-02-21 | Stop reason: SURG

## 2020-02-21 RX ORDER — PROMETHAZINE HYDROCHLORIDE 25 MG/1
12.5 TABLET ORAL ONCE AS NEEDED
Status: DISCONTINUED | OUTPATIENT
Start: 2020-02-21 | End: 2020-02-21 | Stop reason: HOSPADM

## 2020-02-21 RX ORDER — MAGNESIUM HYDROXIDE 1200 MG/15ML
LIQUID ORAL AS NEEDED
Status: DISCONTINUED | OUTPATIENT
Start: 2020-02-21 | End: 2020-02-21 | Stop reason: HOSPADM

## 2020-02-21 RX ORDER — FUROSEMIDE 10 MG/ML
INJECTION INTRAMUSCULAR; INTRAVENOUS AS NEEDED
Status: DISCONTINUED | OUTPATIENT
Start: 2020-02-21 | End: 2020-02-21 | Stop reason: SURG

## 2020-02-21 RX ORDER — FENTANYL CITRATE 50 UG/ML
25 INJECTION, SOLUTION INTRAMUSCULAR; INTRAVENOUS
Status: DISCONTINUED | OUTPATIENT
Start: 2020-02-21 | End: 2020-02-21 | Stop reason: HOSPADM

## 2020-02-21 RX ORDER — SODIUM CHLORIDE, SODIUM LACTATE, POTASSIUM CHLORIDE, CALCIUM CHLORIDE 600; 310; 30; 20 MG/100ML; MG/100ML; MG/100ML; MG/100ML
125 INJECTION, SOLUTION INTRAVENOUS CONTINUOUS
Status: DISCONTINUED | OUTPATIENT
Start: 2020-02-21 | End: 2020-02-21 | Stop reason: HOSPADM

## 2020-02-21 RX ORDER — NEOSTIGMINE METHYLSULFATE 1 MG/ML
INJECTION, SOLUTION INTRAVENOUS AS NEEDED
Status: DISCONTINUED | OUTPATIENT
Start: 2020-02-21 | End: 2020-02-21 | Stop reason: SURG

## 2020-02-21 RX ORDER — OXYCODONE AND ACETAMINOPHEN 7.5; 325 MG/1; MG/1
1 TABLET ORAL EVERY 4 HOURS PRN
Status: DISCONTINUED | OUTPATIENT
Start: 2020-02-21 | End: 2020-02-21 | Stop reason: HOSPADM

## 2020-02-21 RX ORDER — DEXAMETHASONE SODIUM PHOSPHATE 4 MG/ML
4 INJECTION, SOLUTION INTRA-ARTICULAR; INTRALESIONAL; INTRAMUSCULAR; INTRAVENOUS; SOFT TISSUE ONCE AS NEEDED
Status: COMPLETED | OUTPATIENT
Start: 2020-02-21 | End: 2020-02-21

## 2020-02-21 RX ORDER — DEXAMETHASONE SODIUM PHOSPHATE 4 MG/ML
INJECTION, SOLUTION INTRA-ARTICULAR; INTRALESIONAL; INTRAMUSCULAR; INTRAVENOUS; SOFT TISSUE AS NEEDED
Status: DISCONTINUED | OUTPATIENT
Start: 2020-02-21 | End: 2020-02-21 | Stop reason: SURG

## 2020-02-21 RX ORDER — SCOLOPAMINE TRANSDERMAL SYSTEM 1 MG/1
1 PATCH, EXTENDED RELEASE TRANSDERMAL CONTINUOUS
Status: DISCONTINUED | OUTPATIENT
Start: 2020-02-21 | End: 2020-02-21 | Stop reason: HOSPADM

## 2020-02-21 RX ORDER — SODIUM CHLORIDE, SODIUM LACTATE, POTASSIUM CHLORIDE, CALCIUM CHLORIDE 600; 310; 30; 20 MG/100ML; MG/100ML; MG/100ML; MG/100ML
20 INJECTION, SOLUTION INTRAVENOUS CONTINUOUS
Status: DISCONTINUED | OUTPATIENT
Start: 2020-02-21 | End: 2020-02-21 | Stop reason: HOSPADM

## 2020-02-21 RX ORDER — PHENAZOPYRIDINE HYDROCHLORIDE 200 MG/1
200 TABLET, FILM COATED ORAL ONCE
Status: COMPLETED | OUTPATIENT
Start: 2020-02-21 | End: 2020-02-21

## 2020-02-21 RX ADMIN — LIDOCAINE HYDROCHLORIDE 1 EACH: 40 SOLUTION TOPICAL at 07:08

## 2020-02-21 RX ADMIN — MIDAZOLAM 2 MG: 1 INJECTION INTRAMUSCULAR; INTRAVENOUS at 06:39

## 2020-02-21 RX ADMIN — Medication 80 MCG: at 07:28

## 2020-02-21 RX ADMIN — DEXAMETHASONE SODIUM PHOSPHATE 4 MG: 4 INJECTION, SOLUTION INTRAMUSCULAR; INTRAVENOUS at 08:42

## 2020-02-21 RX ADMIN — ONDANSETRON HYDROCHLORIDE 4 MG: 2 SOLUTION INTRAMUSCULAR; INTRAVENOUS at 08:42

## 2020-02-21 RX ADMIN — VASOPRESSIN 0.5 ML: 20 INJECTION INTRAVENOUS at 08:22

## 2020-02-21 RX ADMIN — HYDROMORPHONE HYDROCHLORIDE 0.5 MG: 2 INJECTION INTRAMUSCULAR; INTRAVENOUS; SUBCUTANEOUS at 08:57

## 2020-02-21 RX ADMIN — KETAMINE HYDROCHLORIDE 25 MG: 50 INJECTION, SOLUTION INTRAMUSCULAR; INTRAVENOUS at 07:37

## 2020-02-21 RX ADMIN — FUROSEMIDE 20 MG: 10 INJECTION, SOLUTION INTRAMUSCULAR; INTRAVENOUS at 08:28

## 2020-02-21 RX ADMIN — HYDROMORPHONE HYDROCHLORIDE 0.5 MG: 2 INJECTION INTRAMUSCULAR; INTRAVENOUS; SUBCUTANEOUS at 08:47

## 2020-02-21 RX ADMIN — VASOPRESSIN 0.5 ML: 20 INJECTION INTRAVENOUS at 08:18

## 2020-02-21 RX ADMIN — HYDROMORPHONE HYDROCHLORIDE 0.5 MG: 2 INJECTION INTRAMUSCULAR; INTRAVENOUS; SUBCUTANEOUS at 08:51

## 2020-02-21 RX ADMIN — DEXAMETHASONE SODIUM PHOSPHATE 4 MG: 4 INJECTION, SOLUTION INTRAMUSCULAR; INTRAVENOUS at 06:39

## 2020-02-21 RX ADMIN — SODIUM CHLORIDE, POTASSIUM CHLORIDE, SODIUM LACTATE AND CALCIUM CHLORIDE 125 ML/HR: 600; 310; 30; 20 INJECTION, SOLUTION INTRAVENOUS at 05:52

## 2020-02-21 RX ADMIN — SODIUM CHLORIDE, POTASSIUM CHLORIDE, SODIUM LACTATE AND CALCIUM CHLORIDE 125 ML/HR: 600; 310; 30; 20 INJECTION, SOLUTION INTRAVENOUS at 10:14

## 2020-02-21 RX ADMIN — HYDROMORPHONE HYDROCHLORIDE 0.5 MG: 2 INJECTION INTRAMUSCULAR; INTRAVENOUS; SUBCUTANEOUS at 08:45

## 2020-02-21 RX ADMIN — FENTANYL CITRATE 100 MCG: 50 INJECTION INTRAMUSCULAR; INTRAVENOUS at 07:06

## 2020-02-21 RX ADMIN — SCOPOLAMINE 1 PATCH: 1 PATCH, EXTENDED RELEASE TRANSDERMAL at 06:38

## 2020-02-21 RX ADMIN — PHENAZOPYRIDINE 200 MG: 200 TABLET ORAL at 05:42

## 2020-02-21 RX ADMIN — NEOSTIGMINE METHYLSULFATE 3 MG: 1 INJECTION INTRAVENOUS at 08:42

## 2020-02-21 RX ADMIN — ROCURONIUM BROMIDE 50 MG: 10 INJECTION INTRAVENOUS at 07:07

## 2020-02-21 RX ADMIN — Medication 80 MCG: at 07:25

## 2020-02-21 RX ADMIN — Medication 80 MCG: at 07:18

## 2020-02-21 RX ADMIN — PROPOFOL 150 MG: 10 INJECTION, EMULSION INTRAVENOUS at 07:06

## 2020-02-21 RX ADMIN — ONDANSETRON HYDROCHLORIDE 4 MG: 2 SOLUTION INTRAMUSCULAR; INTRAVENOUS at 09:39

## 2020-02-21 RX ADMIN — KETAMINE HYDROCHLORIDE 25 MG: 50 INJECTION, SOLUTION INTRAMUSCULAR; INTRAVENOUS at 08:17

## 2020-02-21 RX ADMIN — Medication 80 MCG: at 07:21

## 2020-02-21 RX ADMIN — LIDOCAINE HYDROCHLORIDE 100 MG: 20 INJECTION, SOLUTION INFILTRATION; PERINEURAL at 07:06

## 2020-02-21 RX ADMIN — Medication 80 MCG: at 07:15

## 2020-02-21 RX ADMIN — VASOPRESSIN 0.5 ML: 20 INJECTION INTRAVENOUS at 08:16

## 2020-02-21 RX ADMIN — FENTANYL CITRATE 150 MCG: 50 INJECTION INTRAMUSCULAR; INTRAVENOUS at 07:37

## 2020-02-21 RX ADMIN — KETAMINE HYDROCHLORIDE 25 MG: 50 INJECTION, SOLUTION INTRAMUSCULAR; INTRAVENOUS at 07:06

## 2020-02-21 RX ADMIN — GLYCOPYRROLATE 0.4 MG: 0.2 INJECTION, SOLUTION INTRAMUSCULAR; INTRAVENOUS at 08:42

## 2020-02-21 RX ADMIN — Medication 2 G: at 07:22

## 2020-02-21 RX ADMIN — SODIUM CHLORIDE, POTASSIUM CHLORIDE, SODIUM LACTATE AND CALCIUM CHLORIDE 20 ML/HR: 600; 310; 30; 20 INJECTION, SOLUTION INTRAVENOUS at 05:51

## 2020-02-21 NOTE — ANESTHESIA PROCEDURE NOTES
Airway  Urgency: elective    Date/Time: 2/21/2020 7:09 AM  Airway not difficult    General Information and Staff    Patient location during procedure: OR  CRNA: Sheridan Parr CRNA    Indications and Patient Condition  Indications for airway management: airway protection    Preoxygenated: yes  Mask difficulty assessment: 1 - vent by mask    Final Airway Details  Final airway type: endotracheal airway      Successful airway: ETT  Cuffed: yes   Successful intubation technique: direct laryngoscopy  Facilitating devices/methods: intubating stylet  Endotracheal tube insertion site: oral  Blade: Morris  Blade size: 3  ETT size (mm): 7.0  Cormack-Lehane Classification: grade I - full view of glottis  Placement verified by: chest auscultation and capnometry   Cuff volume (mL): 6  Measured from: lips  ETT/EBT  to lips (cm): 22  Number of attempts at approach: 1  Assessment: lips, teeth, and gum same as pre-op and atraumatic intubation

## 2020-02-21 NOTE — OP NOTE
Jj Danielson MD  [facility]  [address]  [place] [id number]  Office [phone]  Fax [phone]      [facility]    Beverley Silverio  [id number]  [id number]  2/21/2020        Hysterectomy Procedure Note      Pre-operative Diagnosis: Pelvic pain and Post ablation syndrome    Post-operative Diagnosis: Pelvic pain and Post ablation syndrome    Operation: Total Laparoscopic Hysterectomy with bilateral salpingectomy, da Jose assisted, Cystoscopy and Laparoscopic lysis of adhesions    Surgeon: CHEYENNE Danielson MD, FACOG    Assistants: ELISSA Allison    Anesthesia: General endotracheal anesthesia    Findings: Adhesions    Estimated Blood Loss: less than 100       Specimens: Uterus, Cervix and Bilateral fallopian tubes    Complications:  None    Disposition: PACU - hemodynamically stable.      Procedure Details    The patient was seen in the Holding Room. The risks, benefits, complications, treatment options, and expected outcomes were discussed with the patient. The patient concurred with the proposed plan, giving informed consent. The patient was identified as Beverley Silverio and the procedure verified as the procedure described above.   A Time Out was held and the above information confirmed.    After these above consents were obtained the patient was taken to the operating room, where general anesthesia was administered. She was placed in the dorsal lithotomy position with care taken placement of her legs to avoid nerve injury. She was prepped and draped in the usual sterile fashion. Gatica catheter was inserted. A complete procedural timeout was completed to ensure proper patient and proper procedure.     A supraumbilical skin incision was made with a knife and the Veress needle was inserted carefully and without difficulty. Opening pressure was 4 mmHg and the abdomen insufflated to 15 mmHg. A number8 bladeless trocar was inserted without difficulty and proper  placement was confirmed with the 5 mm laparoscope. Areas that were immediately adjacent to entry were free of injury. Two additional 8 mm da Jose trocars were placed under direct visualization and one 8 mm AirSeal trocar was placed under direct visualization without difficulty or complication or injury.     The patient was then placed in Trendelenburg position and a Whiteyboard uterine manipulator, colpotomizer and vaginal occluder were inserted under direct visualization. The robot was docked using a side docking technique. Monopolar paddle was placed in arm #1 and bipolar PK instrument into arm #2.  Examination of the pelvis showed the above findings.     There were extensive adhesions in the pelvis.  The anterior surface of the uterus was adhesed to the anterior bowel wall.  The left adnexa was likewise adhesed laterally and superiorly.  There was evidence of bilateral tubal ligation.    The ureters were identified bilaterally. The round ligaments were sealed and transected and retroperitoneal spaces were explored bilaterally. Anteriorly, we created a bladder flap. The bladder was densely adherent to the lower uterine segment and care was taken to properly gain access to the plain to adequately dissect the bladder off of the lower uterine segment and cervix.     Attention was then turned to the adnexa.      Because the patient desired ovarian conservation, but understood the indications for bilateral salpingectomy, I then dissected through the mesosalpinx in order to separate the fallopian tube from the ovary.  The utero-ovarian ligament was then sealed and transected.  This was performed bilaterally.    Dissection was then performed through the posterior leaf of the broad ligament down to the level of the uterosacral ligaments bilaterally. The ureters were once again identified.  The uterine arteries were skeletonized bilaterally, sealed and transected.  Colpotomy was begun and carried around circumferentially until  complete. The specimen was removed vaginally. All pedicles were noted be hemostatic. Vaginal cuff was closed with PDO Quill suture on a loop starting at the right angle and carried over to the left. The endopelvic fascia and anterior vaginal mucosa were approximated to the posterior vaginal mucosa, peritoneum and uterosacral ligaments when appropriate. Once the left angle was reached, the suture was secured and double backed towards the midline x3 throws to create a double layer closure and cut flush with the tissue. The pelvis was copiously irrigated and suctioned and again hemostasis was noted even after desufflation.  Tawanna was placed over all pedicles for added postoperative hemostatic benefits.      The vaginal cuff was then inspected vaginally. Good approximation of the tissue was noted and no bleeding was encountered. The 70 degree cystoscope was used to inspect the bladder and all walls. All walls were normal without abnormality or evidence of injury. Bilateral ureteral patency was noted. At this point, the instruments were removed.     The robot was undocked, insufflation was released and the trocars were removed.  The patient tolerated the procedure well. All instrument counts were correct. She was taken to the recovery room after extubation in stable condition.     Jj Danielson MD  02/21/20  8:42 AM

## 2020-02-21 NOTE — ANESTHESIA PREPROCEDURE EVALUATION
Anesthesia Evaluation     Patient summary reviewed   history of anesthetic complications: PONV  NPO Solid Status: > 8 hours             Airway   Mallampati: II  TM distance: >3 FB  Neck ROM: full  Dental      Pulmonary    (-) COPD, asthma, sleep apnea, not a smoker  Cardiovascular   Exercise tolerance: excellent (>7 METS)    (-) pacemaker, past MI, angina, cardiac stents      Neuro/Psych  (-) seizures, TIA, CVA  GI/Hepatic/Renal/Endo    (+) obesity,     (-) GERD, liver disease, no renal disease, diabetes    Musculoskeletal     Abdominal    Substance History      OB/GYN    (-)  Pregnant        Other                        Anesthesia Plan    ASA 2     general     intravenous induction     Anesthetic plan, all risks, benefits, and alternatives have been provided, discussed and informed consent has been obtained with: patient.

## 2020-02-21 NOTE — DISCHARGE INSTRUCTIONS

## 2020-02-21 NOTE — ANESTHESIA POSTPROCEDURE EVALUATION
Patient: Beverley Silverio    Procedure Summary     Date:  02/21/20 Room / Location:  [facility] OR  /  [facility] OR    Anesthesia Start:  0701 Anesthesia Stop:  0903    Procedure:  TOTAL LAPAROSCOPIC HYSTERECTOMY WITH DAVINCI ROBOT WITH BILATERAL SALPINGECTOMY (TUBES ONLY) (N/A ) Diagnosis:       Dysmenorrhea      (Dysmenorrhea [N94.6])    Surgeon:  Jj Danielson MD Provider:  Sheridan Parr CRNA    Anesthesia Type:  general ASA Status:  2          Anesthesia Type: general    Vitals  Vitals Value Taken Time   /71 2/21/2020 10:30 AM   Temp 97.6 °F (36.4 °C) 2/21/2020 10:30 AM   Pulse 105 2/21/2020 10:30 AM   Resp 14 2/21/2020 10:30 AM   SpO2 93 % 2/21/2020 10:30 AM           Post Anesthesia Care and Evaluation    Patient location during evaluation: PACU  Patient participation: complete - patient participated  Level of consciousness: awake and alert  Pain management: adequate  Airway patency: patent  Anesthetic complications: No anesthetic complications    Cardiovascular status: acceptable  Respiratory status: acceptable  Hydration status: acceptable    Comments: Blood pressure 118/73, pulse 99, temperature 97.6 °F (36.4 °C), temperature source Temporal, resp. rate 14, last menstrual period 02/19/2020, SpO2 91 %, not currently breastfeeding.    Pt discharged from PACU based on estelita score >8

## 2020-02-22 NOTE — TELEPHONE ENCOUNTER
States she had a hysterectomy today. States she is having difficulty breathing when she lies down. States the surgery was done laproscopically. States she is having pain in her right shoulder as well. Discussed ambulating, warm compresses to areas of pain. Explained likely due to gas used for better visualization during surgery. Explained if s/s worsen or if she is unable to tolerate, may need to be evaluated in ER.     Reason for Disposition  • [1] SEVERE post-op pain (e.g., excruciating, pain scale 8-10) AND [2] not controlled with pain medications    Additional Information  • Negative: Sounds like a life-threatening emergency to the triager  • Negative: Chest pain  • Negative: Difficulty breathing  • Negative: Surgical incision symptoms and questions  • Negative: [1] Discomfort (pain, burning or stinging) when passing urine AND [2] male  • Negative: [1] Discomfort (pain, burning or stinging) when passing urine AND [2] female  • Negative: Constipation  • Negative: New or worsening leg (calf, thigh) pain  • Negative: New or worsening leg swelling  • Negative: Dizziness is severe, or persists > 24 hours after surgery  • Negative: Pain, redness, swelling, or pus at IV Site  • Negative: Symptoms arising from use of a urinary catheter (Gatica or Coude)  • Negative: Cast problems or questions  • Negative: Medication question  • Negative: [1] Widespread rash AND [2] bright red, sunburn-like  • Negative: [1] SEVERE headache AND [2] after spinal (epidural) anesthesia  • Negative: [1] Vomiting AND [2] persists > 4 hours  • Negative: [1] Vomiting AND [2] abdomen looks much more swollen than usual  • Negative: [1] Drinking very little AND [2] dehydration suspected (e.g., no urine > 12 hours, very dry mouth, very lightheaded)  • Negative: Patient sounds very sick or weak to the triager  • Negative: Sounds like a serious complication to the triager  • Negative: Fever > 100.4 F (38.0 C)  • Negative: [1] Caller has URGENT question  "AND [2] triager unable to answer question    Answer Assessment - Initial Assessment Questions  1. SYMPTOM: \"What's the main symptom you're concerned about?\" (e.g., pain, fever, vomiting)      See note  2. ONSET: \"When did n/a  start?\"      n/a  3. SURGERY: \"What surgery was performed?\"      n/a  4. DATE of SURGERY: \"When was surgery performed?\"       n/a  5. ANESTHESIA: \" What type of anesthesia did you have?\" (e.g., general, spinal, epidural, local)      n/a  6. PAIN: \"Is there any pain?\" If so, ask: \"How bad is it?\"  (Scale 1-10; or mild, moderate, severe)      n/a  7. FEVER: \"Do you have a fever?\" If so, ask: \"What is your temperature, how was it measured, and when did it start?\"      n/a  8. VOMITING: \"Is there any vomiting?\" If yes, ask: \"How many times?\"      n/a  9. BLEEDING: \"Is there any bleeding?\" If so, ask: \"How much?\" and \"Where?\"      n/a  10. OTHER SYMPTOMS: \"Do you have any other symptoms?\" (e.g., drainage from wound, painful urination, constipation)        n/a    Protocols used: POST-OP SYMPTOMS AND QUESTIONS-ADULT-AH      "

## 2020-02-28 ENCOUNTER — TELEPHONE (OUTPATIENT)
Dept: OBSTETRICS AND GYNECOLOGY | Facility: CLINIC | Age: 35
End: 2020-02-28

## 2020-02-28 NOTE — TELEPHONE ENCOUNTER
Pt calls requesting a release to return to work letter.  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  Wants to return to work on Monday, March 2nd.  Letter written, left at desk for pt to .

## 2020-03-06 ENCOUNTER — OFFICE VISIT (OUTPATIENT)
Dept: OBSTETRICS AND GYNECOLOGY | Facility: CLINIC | Age: 35
End: 2020-03-06

## 2020-03-06 VITALS
WEIGHT: 203 LBS | BODY MASS INDEX: 38.33 KG/M2 | DIASTOLIC BLOOD PRESSURE: 82 MMHG | SYSTOLIC BLOOD PRESSURE: 138 MMHG | HEIGHT: 61 IN

## 2020-03-06 DIAGNOSIS — Z09 POSTOP CHECK: Primary | ICD-10-CM

## 2020-03-06 DIAGNOSIS — Z78.9 NON-SMOKER: ICD-10-CM

## 2020-03-06 PROCEDURE — 99024 POSTOP FOLLOW-UP VISIT: CPT | Performed by: OBSTETRICS & GYNECOLOGY

## 2020-03-06 NOTE — PROGRESS NOTES
"Subjective   Chief Complaint   Patient presents with   • Post-op     Patient here 2 weeks post op from [facility] w/ bilateral salpingectomy done on 02/21/20. Patient voices no complaints or concerns. Patient states she is doing well.      Beverley Silverio is a 34 y.o. female is s/p Total Laparoscopic Hysterectomy With Davinci Robot With Bilateral Salpingectomy (tubes Only) on 2/21/2020.  Currently she reports no problems with eating, bowel movements, voiding, or wound drainage and pain is well controlled.    Pathology  Final Diagnosis   Uterus with bilateral fallopian tubes, hysterectomy with bilateral salpingectomies:  Benign cervix, negative for dysplasia.  Proliferative phase endometrium, negative for hyperplasia and atypia.  Benign intramural leiomyomata.  Bilateral fallopian tubes with no pathologic changes.         No Additional Complaints Reported    The following portions of the patient's history were reviewed and updated as appropriate:problem list, current medications, allergies, past family history, past medical history, past social history and past surgical history    Review of Systems      Objective   /82   Ht 154.9 cm (61\")   Wt 92.1 kg (203 lb)   LMP 02/19/2020 (Exact Date)   Breastfeeding No   BMI 38.36 kg/m²     General:  well developed; well nourished  no acute distress   Abdomen: Incisions are healing well  soft, non-tender; bowel sounds normal; no masses, no organomegaly   Pelvis: Not performed.          Assessment   1. Post op follow up s/p Total Laparoscopic Hysterectomy With Davinci Robot With Bilateral Salpingectomy (tubes Only) performed 2 weeks ago for Post ablation syndrome     Plan   1. Continue pelvic rest  2. Return office in 1 month for pelvic exam  3. Call for concerns or questions    No orders of the defined types were placed in this encounter.         This note was electronically signed.    Jj Danielson MD  March 6, 2020  "

## 2020-04-03 ENCOUNTER — OFFICE VISIT (OUTPATIENT)
Dept: OBSTETRICS AND GYNECOLOGY | Facility: CLINIC | Age: 35
End: 2020-04-03

## 2020-04-03 VITALS
BODY MASS INDEX: 39.46 KG/M2 | SYSTOLIC BLOOD PRESSURE: 110 MMHG | WEIGHT: 209 LBS | HEIGHT: 61 IN | DIASTOLIC BLOOD PRESSURE: 80 MMHG

## 2020-04-03 DIAGNOSIS — Z09 POSTOP CHECK: Primary | ICD-10-CM

## 2020-04-03 DIAGNOSIS — Z78.9 NON-SMOKER: ICD-10-CM

## 2020-04-03 PROBLEM — N94.6 DYSMENORRHEA: Status: RESOLVED | Noted: 2019-12-18 | Resolved: 2020-04-03

## 2020-04-03 PROCEDURE — 99024 POSTOP FOLLOW-UP VISIT: CPT | Performed by: OBSTETRICS & GYNECOLOGY

## 2020-04-03 NOTE — PROGRESS NOTES
"Subjective   Chief Complaint   Patient presents with   • Post-op     Pt voices no problems/complaints.     Beverley Silverio is a 34 y.o. female is s/p Total Laparoscopic Hysterectomy With Davinci Robot With Bilateral Salpingectomy (tubes Only) on 2/21/2020.  Currently she reports no problems with eating, bowel movements, voiding, or wound drainage and pain is well controlled.    Pathology  Uterus with bilateral fallopian tubes, hysterectomy with bilateral salpingectomies:  Benign cervix, negative for dysplasia.  Proliferative phase endometrium, negative for hyperplasia and atypia.  Benign intramural leiomyomata.  Bilateral fallopian tubes with no pathologic changes    No Additional Complaints Reported    The following portions of the patient's history were reviewed and updated as appropriate:problem list, current medications, allergies, past family history, past medical history, past social history and past surgical history    Review of Systems      Objective   /80   Ht 154.9 cm (61\")   Wt 94.8 kg (209 lb)   LMP 02/19/2020 (Exact Date)   Breastfeeding No   BMI 39.49 kg/m²     General:  well developed; well nourished  no acute distress   Abdomen: Incisions healed well  soft, non-tender; bowel sounds normal; no masses, no organomegaly   Pelvis: Clinical staff was present for exam  Vaginal cuff has healed well without evidence of infection or dehiscence.          Assessment   1. Post op follow up s/p Total Laparoscopic Hysterectomy With Davinci Robot With Bilateral Salpingectomy (tubes Only) performed 6 weeks ago for Chronic Pelvic Pain     Plan   1. Resume normal activities  2. RTO 3 months  3. Call for concerns or questions    No orders of the defined types were placed in this encounter.         This note was electronically signed.    Jj Danielson MD  April 3, 2020  "

## 2020-07-06 ENCOUNTER — LAB (OUTPATIENT)
Dept: LAB | Facility: HOSPITAL | Age: 35
End: 2020-07-06

## 2020-07-06 ENCOUNTER — OFFICE VISIT (OUTPATIENT)
Dept: OBSTETRICS AND GYNECOLOGY | Facility: CLINIC | Age: 35
End: 2020-07-06

## 2020-07-06 VITALS
DIASTOLIC BLOOD PRESSURE: 80 MMHG | HEIGHT: 61 IN | WEIGHT: 215 LBS | BODY MASS INDEX: 40.59 KG/M2 | SYSTOLIC BLOOD PRESSURE: 120 MMHG

## 2020-07-06 DIAGNOSIS — Z78.9 NON-SMOKER: ICD-10-CM

## 2020-07-06 DIAGNOSIS — E05.90 HYPERTHYROIDISM: ICD-10-CM

## 2020-07-06 DIAGNOSIS — E05.90 HYPERTHYROIDISM: Primary | ICD-10-CM

## 2020-07-06 DIAGNOSIS — R10.2 PELVIC PAIN: Primary | ICD-10-CM

## 2020-07-06 PROBLEM — R10.32 LEFT LOWER QUADRANT PAIN: Status: RESOLVED | Noted: 2019-04-18 | Resolved: 2020-07-06

## 2020-07-06 PROCEDURE — 84443 ASSAY THYROID STIM HORMONE: CPT | Performed by: OTOLARYNGOLOGY

## 2020-07-06 PROCEDURE — 36415 COLL VENOUS BLD VENIPUNCTURE: CPT

## 2020-07-06 PROCEDURE — 99213 OFFICE O/P EST LOW 20 MIN: CPT | Performed by: OBSTETRICS & GYNECOLOGY

## 2020-07-06 NOTE — PROGRESS NOTES
Subjective   Beverley Silverio is a 34 y.o. female is here today for follow-up.    Patient presents today to follow-up on pelvic pain and dysmenorrhea    History of Present Illness     Previous failed treatments include nonsteroidals, ablation, and as needed Lysteda.  Subsequently, she underwent da Jose hysterectomy with bilateral salpingectomy, lysis of adhesions, cystoscopy 3 months ago.  Her symptoms have resolved.    She has been sexually active since her surgery.  She is having no pain with intercourse or bleeding.  She is having no issues at all.    She also denies any vasomotor symptoms or other menopausal symptoms.    The following portions of the patient's history were reviewed and updated as appropriate: allergies, current medications, past family history, past medical history, past social history, past surgical history and problem list.    Review of Systems   All other systems reviewed and are negative.      Objective   Physical Exam   Constitutional: She is oriented to person, place, and time. She appears well-developed and well-nourished.   HENT:   Head: Normocephalic and atraumatic.   Abdominal: Soft. Bowel sounds are normal.   Laparoscopic sites have healed well.   Neurological: She is alert and oriented to person, place, and time.   Skin: Skin is warm and dry.   Psychiatric: She has a normal mood and affect. Her behavior is normal. Judgment and thought content normal.   Nursing note and vitals reviewed.        Assessment/Plan   Beverley was seen today for six month follow up.    Diagnoses and all orders for this visit:    Pelvic pain    Non-smoker    Symptoms have resolved.  Call for concerns or questions.  Pap smears are no longer indicated in this patient due to her normal cervical pathology as well as no history of previous conization.  Return office in 1 year for annual exam.    Jj Danielson MD

## 2020-07-07 LAB — TSH SERPL DL<=0.05 MIU/L-ACNC: 0.64 UIU/ML (ref 0.27–4.2)

## 2020-07-09 ENCOUNTER — TELEPHONE (OUTPATIENT)
Dept: OTOLARYNGOLOGY | Facility: CLINIC | Age: 35
End: 2020-07-09

## 2020-07-09 DIAGNOSIS — E89.0 S/P PARTIAL THYROIDECTOMY: ICD-10-CM

## 2020-07-09 DIAGNOSIS — E06.3 THYROIDITIS, LYMPHOCYTIC: ICD-10-CM

## 2020-07-09 DIAGNOSIS — E05.90 HYPERTHYROIDISM: Primary | ICD-10-CM

## 2020-07-09 DIAGNOSIS — E04.1 THYROID NODULE: ICD-10-CM

## 2020-07-09 NOTE — TELEPHONE ENCOUNTER
Gave TSH results to pt who voiced understanding, 1 year f/u scheduled and us thyroid sent to [facility]

## 2020-08-03 ENCOUNTER — OFFICE VISIT (OUTPATIENT)
Dept: OTOLARYNGOLOGY | Facility: CLINIC | Age: 35
End: 2020-08-03

## 2020-08-03 VITALS
WEIGHT: 220 LBS | DIASTOLIC BLOOD PRESSURE: 81 MMHG | SYSTOLIC BLOOD PRESSURE: 147 MMHG | BODY MASS INDEX: 41.54 KG/M2 | TEMPERATURE: 97.7 F | HEIGHT: 61 IN | HEART RATE: 95 BPM

## 2020-08-03 DIAGNOSIS — E04.2 MULTINODULAR GOITER: ICD-10-CM

## 2020-08-03 DIAGNOSIS — E06.3 HASHIMOTO'S THYROIDITIS: ICD-10-CM

## 2020-08-03 PROCEDURE — 99213 OFFICE O/P EST LOW 20 MIN: CPT | Performed by: OTOLARYNGOLOGY

## 2020-08-03 NOTE — PROGRESS NOTES
Chief Complaint   Patient presents with   • Thyroid Problem     weight up and down, tired all the time       Subjective   History of Present Illness:  Beverley Silverio is a 34 y.o. female returns for follow up. The patient denies complaints. The patient has not had a neck mass, trouble swallowing or lymphadenopathy. The patient has not had  weight gain, fluid retention or trouble concentrating.    Review of Systems   HENT: No neck mass, no trouble swallowing; CONSTITUTIONAL: No fever, chills, no weight gain; GI: no nausea    Past History:  History reviewed on the chart and updated as necessary.  Allergies:  Sulfa antibiotics     Objective   Vital Signs  Temp:  [97.7 °F (36.5 °C)] 97.7 °F (36.5 °C)  Heart Rate:  [95] 95  BP: (147)/(81) 147/81    Physical Exam  CONSTITUTIONAL: well nourished, well-developed, alert, oriented, in no acute distress   COMMUNICATION AND VOICE: able to communicate normally, normal voice quality  HEAD: normocephalic, no lesions, atraumatic, no tenderness, no masses   FACE: appearance normal, no lesions, no tenderness, no deformities, facial motion symmetric  EYES: ocular motility normal, eyelids normal, orbits normal, no proptosis, conjunctiva normal , pupils equal, round   EARS:  Hearing: hearing to conversational voice intact bilaterally   External Ears: normal bilaterally, no lesions  NOSE:  External Nose: external nasal structure normal, no tenderness on palpation, no nasal discharge, no lesions, no evidence of trauma, nostrils patent   ORAL:  Lips: upper and lower lips without lesion   NECK:  Inspection and Palpation: neck appearance normal, no masses or tenderness  THYROID: well healed thyroidectomy site present with lobectomy changes present  CHEST/RESPIRATORY: normal respiratory effort   CARDIOVASCULAR: no cyanosis or edema   NEUROLOGICAL/PSYCHIATRIC: oriented to time, place and person, mood normal, affect appropriate, CN II-XII intact grossly    Results Review:   I  reviewed the patient's new clinical results.  Lab Results   Component Value Date    TSH 0.640 07/06/2020    TSH 0.935 10/07/2019    TSH 1.030 09/03/2019    C2JTLXS 155 06/24/2019    L5GJZWK 9.5 06/24/2019    THYROIDAB 7 06/24/2019     Thyroid Ultrasound: There is a 5 mm nodule in the remaining left thyroid.   Assessment/Plan   Assessment:  1. Multinodular goiter    2. Hashimoto's thyroiditis        Plan:        She has subcentimeter nodules after a previous right sided thyroidectomy.  I feel that these nodules most likely represent continued change from a  thyroiditis.  I feel that she needs a ultrasound possibly in 2 years but does not need close follow-up with a surgeon.  I will turn her back over to her primary care physician but will be available if there is any change of these nodules.    Return if symptoms worsen or fail to improve.     Cyrus Gil MD  08/03/20  13:40

## 2022-05-20 ENCOUNTER — TRANSCRIBE ORDERS (OUTPATIENT)
Dept: ADMINISTRATIVE | Facility: HOSPITAL | Age: 37
End: 2022-05-20

## 2022-05-20 DIAGNOSIS — R07.9 CHEST PAIN, UNSPECIFIED TYPE: ICD-10-CM

## 2022-05-20 DIAGNOSIS — E66.01 MORBID OBESITY: ICD-10-CM

## 2022-05-20 DIAGNOSIS — F41.1 GENERALIZED ANXIETY DISORDER: Primary | ICD-10-CM

## 2022-06-03 ENCOUNTER — HOSPITAL ENCOUNTER (OUTPATIENT)
Dept: CARDIOLOGY | Facility: HOSPITAL | Age: 37
Discharge: HOME | End: 2022-06-03

## 2022-06-03 VITALS
SYSTOLIC BLOOD PRESSURE: 122 MMHG | WEIGHT: 218 LBS | HEART RATE: 82 BPM | HEIGHT: 61 IN | BODY MASS INDEX: 41.16 KG/M2 | DIASTOLIC BLOOD PRESSURE: 77 MMHG

## 2022-06-03 DIAGNOSIS — R07.9 CHEST PAIN, UNSPECIFIED TYPE: ICD-10-CM

## 2022-06-03 DIAGNOSIS — E66.01 MORBID OBESITY: ICD-10-CM

## 2022-06-03 DIAGNOSIS — F41.1 GENERALIZED ANXIETY DISORDER: ICD-10-CM

## 2022-06-03 PROCEDURE — 93005 ELECTROCARDIOGRAM TRACING: CPT | Performed by: NURSE PRACTITIONER

## 2022-06-03 PROCEDURE — 93017 CV STRESS TEST TRACING ONLY: CPT

## 2022-06-03 PROCEDURE — 93018 CV STRESS TEST I&R ONLY: CPT | Performed by: INTERNAL MEDICINE

## 2022-06-03 PROCEDURE — 93010 ELECTROCARDIOGRAM REPORT: CPT | Performed by: EMERGENCY MEDICINE

## 2022-06-05 LAB
BH CV STRESS BP STAGE 1: NORMAL
BH CV STRESS BP STAGE 2: NORMAL
BH CV STRESS DURATION MIN STAGE 1: 3
BH CV STRESS DURATION MIN STAGE 2: 3
BH CV STRESS DURATION SEC STAGE 1: 0
BH CV STRESS DURATION SEC STAGE 2: 0
BH CV STRESS GRADE STAGE 1: 10
BH CV STRESS GRADE STAGE 2: 12
BH CV STRESS HR STAGE 1: 146
BH CV STRESS HR STAGE 2: 164
BH CV STRESS METS STAGE 1: 5
BH CV STRESS METS STAGE 2: 7.5
BH CV STRESS PROTOCOL 1: NORMAL
BH CV STRESS RECOVERY BP: NORMAL MMHG
BH CV STRESS RECOVERY HR: 113 BPM
BH CV STRESS SPEED STAGE 1: 1.7
BH CV STRESS SPEED STAGE 2: 2.5
BH CV STRESS STAGE 1: 1
BH CV STRESS STAGE 2: 2
MAXIMAL PREDICTED HEART RATE: 182 BPM
PERCENT MAX PREDICTED HR: 90.11 %
QT INTERVAL: 374 MS
QTC INTERVAL: 431 MS
STRESS BASELINE BP: NORMAL MMHG
STRESS BASELINE HR: 82 BPM
STRESS PERCENT HR: 106 %
STRESS POST ESTIMATED WORKLOAD: 7.5 METS
STRESS POST EXERCISE DUR MIN: 6 MIN
STRESS POST EXERCISE DUR SEC: 0 SEC
STRESS POST PEAK BP: NORMAL MMHG
STRESS POST PEAK HR: 164 BPM
STRESS TARGET HR: 155 BPM

## 2022-08-02 NOTE — PROGRESS NOTES
Jj Danielson MD  [facility]  [address]  [place] [address]  Office [phone]  Fax [phone]      [facility]  Beverley Silverio  1985  [id number]  [id number]  2020    Subjective   Beverley Silverio is a 34 y.o. year old female  who presents for surgery due to Chronic Pelvic Pain and Dysmenorrhea.  Failed conservative measures include NSAIDS, Endometrial Ablation and lysteda.  Although flow improved with ablation, pain has worsened and flow still present.  No improvement with NSAIDs or Lysteda.    COEMIG Procedure yes  Rating of Pain 9  Effect on daily activities Significant    HPI    Risks, benefits, and alternatives of a hysterectomy were discussed with the patient in detail. Intraoperative risks of bleeding and damage to surrounding organs, including but not limited to intestine, bladder and ureter, were explained. Management of these were also explained. Postoperative complications such as infection, pneumonia, DVT, and bleeding were explained. Cuff dehiscence and cuff hematoma/cellulitis were also explained. The importance of compliance with postoperative restrictions was discussed. Long term effect regarding pelvic organ prolapse was also explained. Laparoscopic approach with use of the da Jose robotic system was explained. Indications for conversion to a laparotomy were discussed.     Ovarian conservation/removal was also discussed. Menopausal symptoms associated with adnexectomy were explained in detail. Reduction in the risk of ovarian cancer was discussed. Unknown benefits of ovarian conservation were also discussed.     Removal of fallopian tubes regardless of ovarian removal was discussed and patient verbalized understanding.    All of the patient's questions were answered to her satisfaction. She was encouraged to return for an additional appointment if she had further questions. She verbalized understanding of the above and wished  to proceed with the outlined plan.    Desires ovarian conservation      Past Medical History:   Diagnosis Date   • Abnormal Pap smear of cervix    • Anxiety    • Family history of thyroid cancer    • Menorrhagia with irregular cycle    • Migraine    • S/P right thyroidectomy 2019   • Thyroid nodule        Past Surgical History:   Procedure Laterality Date   •  SECTION WITH TUBAL  2005   • D&C HYSTEROSCOPY ENDOMETRIAL ABLATION N/A 2017    Procedure: DILATATION AND CURETTAGE HYSTEROSCOPY NOVASURE ENDOMETRIAL ABLATION;  Surgeon: Jj Danielson MD;  Location: [facility] OR;  Service:    • THYROIDECTOMY Right 2019    Procedure: RIGHT THYROIDECTOMY with laryngeal nerve monitor;  Surgeon: Cyrus Gil MD;  Location:  [facility] OR;  Service: ENT         Current Outpatient Medications:   •  ibuprofen (ADVIL,MOTRIN) 800 MG tablet, Take 800 mg by mouth As Needed., Disp: , Rfl:   •  Tranexamic Acid 650 MG tablet, Take 2 tablets by mouth 3 (Three) Times a Day As Needed (Painful menstruation)., Disp: 30 tablet, Rfl: 3    Allergies   Allergen Reactions   • Sulfa Antibiotics Rash       Family History   Problem Relation Age of Onset   • Skin cancer Father    • No Known Problems Mother    • No Known Problems Sister    • No Known Problems Daughter    • Ovarian cancer Paternal Grandmother    • Breast cancer Paternal Aunt    • Colon cancer Neg Hx    • Colon polyps Neg Hx    • Uterine cancer Neg Hx        Social History     Socioeconomic History   • Marital status:      Spouse name: Not on file   • Number of children: Not on file   • Years of education: Not on file   • Highest education level: Not on file   Tobacco Use   • Smoking status: Former Smoker     Packs/day: 0.00     Years: 17.00     Pack years: 0.00     Types: Cigarettes     Last attempt to quit: 10/10/2018     Years since quittin.3   • Smokeless tobacco: Never Used   • Tobacco comment: QUIT  2 YRS AGO   Substance and Sexual Activity   •  Alcohol use: Yes     Comment: SOCIALLY   • Drug use: No   • Sexual activity: Yes     Partners: Male     Birth control/protection: Surgical       OB History    Para Term  AB Living   1 1 1 0 0 1   SAB TAB Ectopic Molar Multiple Live Births   0 0 0 0 0 1      # Outcome Date GA Lbr Gilberto/2nd Weight Sex Delivery Anes PTL Lv   1 Term 05 40w0d  2977 g (6 lb 9 oz) F CS-LTranv Spinal N LARISSA      Complications: Fetal Intolerance       Review of Systems   Genitourinary: Positive for menstrual problem and pelvic pain.   All other systems reviewed and are negative.         Objective   Vitals:    20 1037   BP: 110/80       Physical Exam   Constitutional: She is oriented to person, place, and time. She appears well-developed and well-nourished.   HENT:   Head: Normocephalic and atraumatic.   Neck: Normal range of motion. Neck supple.   Cardiovascular: Normal rate and regular rhythm.   Pulmonary/Chest: Effort normal and breath sounds normal.   Abdominal: Soft. Bowel sounds are normal.   Neurological: She is alert and oriented to person, place, and time.   Skin: Skin is warm and dry.   Psychiatric: She has a normal mood and affect. Her behavior is normal. Judgment and thought content normal.   Nursing note and vitals reviewed.         Assessment/Plan   Assessment/Plan:  Beverley was seen today for dysmenorrhea.    Diagnoses and all orders for this visit:    Dysmenorrhea    Non-smoker      Plan Da Jose Hysterectomy with Bilateral Salpingectomy and Cystoscopy  The risks, benefits, and alternatives of the procedure; along with the risks of anesthesia was discussed in full with the patient and all questions were answered.    Jj Danielson MD        Community Resource

## 2023-06-19 ENCOUNTER — OFFICE VISIT (OUTPATIENT)
Age: 38
End: 2023-06-19
Payer: COMMERCIAL

## 2023-06-19 VITALS
DIASTOLIC BLOOD PRESSURE: 80 MMHG | WEIGHT: 221 LBS | HEIGHT: 61 IN | RESPIRATION RATE: 20 BRPM | BODY MASS INDEX: 41.72 KG/M2 | SYSTOLIC BLOOD PRESSURE: 130 MMHG | OXYGEN SATURATION: 98 % | HEART RATE: 123 BPM | TEMPERATURE: 97.9 F

## 2023-06-19 DIAGNOSIS — K52.9 GASTROENTERITIS: Primary | ICD-10-CM

## 2023-06-19 DIAGNOSIS — Z11.52 ENCOUNTER FOR SCREENING FOR COVID-19: ICD-10-CM

## 2023-06-19 LAB — SARS-COV-2 N GENE RESP QL NAA+PROBE: DETECTED

## 2023-06-19 PROCEDURE — 99213 OFFICE O/P EST LOW 20 MIN: CPT | Performed by: NURSE PRACTITIONER

## 2023-06-19 RX ORDER — ONDANSETRON 4 MG/1
4 TABLET, ORALLY DISINTEGRATING ORAL 3 TIMES DAILY PRN
Qty: 6 TABLET | Refills: 0 | Status: SHIPPED | OUTPATIENT
Start: 2023-06-19

## 2023-06-19 ASSESSMENT — ENCOUNTER SYMPTOMS
RESPIRATORY NEGATIVE: 1
VOMITING: 1
NAUSEA: 1
ABDOMINAL PAIN: 0
ALLERGIC/IMMUNOLOGIC NEGATIVE: 1
DIARRHEA: 1
EYES NEGATIVE: 1

## 2023-06-19 NOTE — PROGRESS NOTES
Chapo Doty (:  1985) is a 42 y.o. female,Established patient, here for evaluation of the following chief complaint(s):  Emesis, Nausea, Headache, and Generalized Body Aches    Patient presents today complaining of nausea, vomiting, headache, body aches, chills that began yesterday. Denies sore throat, fever, abdominal pain, cough. Patient is requesting sleep medication. Informed patient I do not prescribe medication for sleep. She may try over the counter medications or see her PCP. Care instructions discussed. Patient verbalized understanding and agrees to plan of care. ASSESSMENT/PLAN:  1. Gastroenteritis  -     ondansetron (ZOFRAN-ODT) 4 MG disintegrating tablet; Place 1 tablet under the tongue 3 times daily as needed for Nausea, Disp-6 tablet, R-0Normal  2. Encounter for screening for COVID-19     Orders Placed This Encounter   Medications    ondansetron (ZOFRAN-ODT) 4 MG disintegrating tablet     Sig: Place 1 tablet under the tongue 3 times daily as needed for Nausea     Dispense:  6 tablet     Refill:  0        Return if symptoms worsen or fail to improve. Subjective   SUBJECTIVE/OBJECTIVE:  Emesis   Associated symptoms include chills, diarrhea, headaches and myalgias. Pertinent negatives include no abdominal pain or fever. Headache  Generalized Body Aches  Associated symptoms include chills, headaches, myalgias, nausea and vomiting. Pertinent negatives include no abdominal pain or fever. Review of Systems   Constitutional:  Positive for chills. Negative for fever. HENT: Negative. Eyes: Negative. Respiratory: Negative. Cardiovascular: Negative. Gastrointestinal:  Positive for diarrhea, nausea and vomiting. Negative for abdominal pain. Endocrine: Negative. Genitourinary: Negative. Musculoskeletal:  Positive for myalgias. Skin: Negative. Allergic/Immunologic: Negative. Neurological:  Positive for headaches. Hematological: Negative.

## 2023-06-19 NOTE — PATIENT INSTRUCTIONS
Rest.    Increase fluid intake. Eat a bland diet. Do not take any medications to stop diarrhea. Take Zofran for nausea as needed. You will receive a phone call regarding covid test results. Follow up with PCP as needed.

## (undated) DEVICE — GLV SURG BIOGEL M LTX PF 7 1/2

## (undated) DEVICE — 3M™ STERI-DRAPE™ INSTRUMENT POUCH 1018: Brand: STERI-DRAPE™

## (undated) DEVICE — HEWSON SUTURE RETRIEVER: Brand: HEWSON SUTURE RETRIEVER

## (undated) DEVICE — DAVINCI: Brand: MEDLINE INDUSTRIES, INC.

## (undated) DEVICE — GOWN,NON-REINFORCED,SIRUS,SET IN SLV,XXL: Brand: MEDLINE

## (undated) DEVICE — TROC ANCHORPORT BLADELES LP 8X120MM

## (undated) DEVICE — PAD MINOR UNIVERSAL: Brand: MEDLINE INDUSTRIES, INC.

## (undated) DEVICE — STANDARD HYPODERMIC NEEDLE,POLYPROPYLENE HUB: Brand: MONOJECT

## (undated) DEVICE — GOWN,NON-REINFORCED,SIRUS,SET IN SLV,XL: Brand: MEDLINE

## (undated) DEVICE — SPNG DISSCT CHRRY 3/8IN STRL PK/5

## (undated) DEVICE — CLTH CLENS READYCLEANSE PERI CARE PK/5

## (undated) DEVICE — CANNULA SEAL

## (undated) DEVICE — ADHS LIQ MASTISOL 2/3ML

## (undated) DEVICE — 3M™ STERI-STRIP™ REINFORCED ADHESIVE SKIN CLOSURES, R1546, 1/4 IN X 4 IN (6 MM X 100 MM), 10 STRIPS/ENVELOPE: Brand: 3M™ STERI-STRIP™

## (undated) DEVICE — BLADELESS OBTURATOR: Brand: WECK VISTA

## (undated) DEVICE — ST TBG AIRSEAL FLTR TRI LUM

## (undated) DEVICE — GLV SURG SENSICARE W/ALOE PF LF 7.5 STRL

## (undated) DEVICE — HDRST INTUB GENTLETOUCH SLOT 7IN RT

## (undated) DEVICE — CAP CONN RED

## (undated) DEVICE — APPL HEMO SURG ARISTA/AH/FLEXITIP XL 38CM

## (undated) DEVICE — SUT MNCRYL 4/0 P3 18IN UD MCP494G

## (undated) DEVICE — GLV SURG SENSICARE SLT PF LF 7.5 STRL

## (undated) DEVICE — SUT SILK 2/0 SUTUPAK TIES 24IN SA75H

## (undated) DEVICE — UTILITY MARKER W/MED LABELS: Brand: MEDLINE

## (undated) DEVICE — TOTAL TRAY, 16FR 10ML SIL FOLEY, URN: Brand: MEDLINE

## (undated) DEVICE — LAPAROSCOPY WOUND CLOSURE SYSTEM KIT CONSISTS OF: 05391529640002; NEOCLOSE ANCHORGUIDE 5/12 & 8/15 US; MODEL NUMBER NCA515-U; QTY 10 AND 05391529640217; NEOCLOSE 4 AUTOANCHOR PACK US; MODEL NUMBER NCAA4-U; QTY 10: Brand: NEOCLOSE ANCHORGUIDE 5/15 PORT CLOSURE KIT US (2 PORT)

## (undated) DEVICE — ANTIBACTERIAL UNDYED BRAIDED (POLYGLACTIN 910), SYNTHETIC ABSORBABLE SUTURE: Brand: COATED VICRYL

## (undated) DEVICE — CYSTO/BLADDER IRRIGATION SET, REGULATING CLAMP

## (undated) DEVICE — 1000 SES SMOKE EVACUATION SYSTEM, HAND HELD TUBING SET: Brand: 1000 SES

## (undated) DEVICE — HARMONIC FOCUS SHEARS 9CM LENGTH + ADAPTIVE TISSUE TECHNOLOGY FOR USE WITH BLUE HAND PIECE ONLY: Brand: HARMONIC FOCUS

## (undated) DEVICE — PK TURNOVER RM ADV

## (undated) DEVICE — DISPOSABLE IRRIGATION BIPOLAR CORD, M1000 TYPE: Brand: KIRWAN

## (undated) DEVICE — SURGICAL SUCTION CONNECTING TUBE WITH MALE CONNECTOR AND SUCTION CLAMP, 2 FT. LONG (.6 M), 5 MM I.D.: Brand: CONMED

## (undated) DEVICE — GLV SURG PREMIERPRO ORTHO LTX PF SZ6.5 BRN

## (undated) DEVICE — SUT SILK 3/0 SUTUPAK TIES 24IN SA74H

## (undated) DEVICE — 30977 SEE SHARP - ENHANCED INTRAOPERATIVE LAPAROSCOPE CLEANING & DEFOGGING: Brand: 30977 SEE SHARP - ENHANCED INTRAOPERATIVE LAPAROSCOPE CLEANING & DEFOGGING

## (undated) DEVICE — EMG TUBE 8229707 NIM TRIVANTAGE 7.0MM ID: Brand: NIM TRIVANTAGE™

## (undated) DEVICE — ELECTRD BLD EDGE/INSUL1P 2.4X5.1MM STRL

## (undated) DEVICE — GLV SURG NEOLON 2G PF LF 7.5 STRL

## (undated) DEVICE — GLV SURG SENSICARE W/ALOE PF LF SZ6 STRL

## (undated) DEVICE — ARM DRAPE

## (undated) DEVICE — TRY PREP SCRB VAG PVP

## (undated) DEVICE — TUBING, SUCTION, 1/4" X 12', STRAIGHT: Brand: MEDLINE

## (undated) DEVICE — RETR STAY HK ELAS SHRP 5MM 50PK

## (undated) DEVICE — PROB ABL ENDOMTRL NOVASURE/G1 W/SURESND BIP

## (undated) DEVICE — PROBE 8225101 5PK STD PRASS FL TIP ROHS

## (undated) DEVICE — PAD D&C: Brand: MEDLINE INDUSTRIES, INC.